# Patient Record
Sex: FEMALE | URBAN - METROPOLITAN AREA
[De-identification: names, ages, dates, MRNs, and addresses within clinical notes are randomized per-mention and may not be internally consistent; named-entity substitution may affect disease eponyms.]

---

## 2019-10-16 ENCOUNTER — INPATIENT (INPATIENT)
Facility: HOSPITAL | Age: 44
LOS: 4 days | Discharge: HOME | DRG: 871 | End: 2019-10-21
Attending: INTERNAL MEDICINE | Admitting: INTERNAL MEDICINE
Payer: MEDICAID

## 2019-10-16 VITALS
HEIGHT: 60 IN | HEART RATE: 85 BPM | OXYGEN SATURATION: 97 % | RESPIRATION RATE: 18 BRPM | SYSTOLIC BLOOD PRESSURE: 90 MMHG | TEMPERATURE: 98 F | WEIGHT: 173.06 LBS | DIASTOLIC BLOOD PRESSURE: 64 MMHG

## 2019-10-16 DIAGNOSIS — M32.9 SYSTEMIC LUPUS ERYTHEMATOSUS, UNSPECIFIED: ICD-10-CM

## 2019-10-16 DIAGNOSIS — A41.9 SEPSIS, UNSPECIFIED ORGANISM: ICD-10-CM

## 2019-10-16 DIAGNOSIS — E11.9 TYPE 2 DIABETES MELLITUS WITHOUT COMPLICATIONS: ICD-10-CM

## 2019-10-16 DIAGNOSIS — R79.1 ABNORMAL COAGULATION PROFILE: ICD-10-CM

## 2019-10-16 DIAGNOSIS — I26.99 OTHER PULMONARY EMBOLISM WITHOUT ACUTE COR PULMONALE: ICD-10-CM

## 2019-10-16 DIAGNOSIS — D61.818 OTHER PANCYTOPENIA: ICD-10-CM

## 2019-10-16 DIAGNOSIS — Z29.9 ENCOUNTER FOR PROPHYLACTIC MEASURES, UNSPECIFIED: ICD-10-CM

## 2019-10-16 DIAGNOSIS — N18.6 END STAGE RENAL DISEASE: ICD-10-CM

## 2019-10-16 DIAGNOSIS — I10 ESSENTIAL (PRIMARY) HYPERTENSION: ICD-10-CM

## 2019-10-16 DIAGNOSIS — L03.90 CELLULITIS, UNSPECIFIED: ICD-10-CM

## 2019-10-16 LAB
ALBUMIN SERPL ELPH-MCNC: 1.3 G/DL — LOW (ref 3.5–5)
ALP SERPL-CCNC: 100 U/L — SIGNIFICANT CHANGE UP (ref 40–120)
ALT FLD-CCNC: 165 U/L DA — HIGH (ref 10–60)
ANION GAP SERPL CALC-SCNC: 9 MMOL/L — SIGNIFICANT CHANGE UP (ref 5–17)
APTT BLD: 70.9 SEC — HIGH (ref 27.5–36.3)
AST SERPL-CCNC: 94 U/L — HIGH (ref 10–40)
BASOPHILS # BLD AUTO: 0 K/UL — SIGNIFICANT CHANGE UP (ref 0–0.2)
BASOPHILS NFR BLD AUTO: 0 % — SIGNIFICANT CHANGE UP (ref 0–2)
BILIRUB SERPL-MCNC: 2.3 MG/DL — HIGH (ref 0.2–1.2)
BUN SERPL-MCNC: 44 MG/DL — HIGH (ref 7–18)
CALCIUM SERPL-MCNC: 6.8 MG/DL — LOW (ref 8.4–10.5)
CHLORIDE SERPL-SCNC: 100 MMOL/L — SIGNIFICANT CHANGE UP (ref 96–108)
CO2 SERPL-SCNC: 25 MMOL/L — SIGNIFICANT CHANGE UP (ref 22–31)
CREAT SERPL-MCNC: 6.81 MG/DL — HIGH (ref 0.5–1.3)
EOSINOPHIL # BLD AUTO: 0.05 K/UL — SIGNIFICANT CHANGE UP (ref 0–0.5)
EOSINOPHIL NFR BLD AUTO: 4.7 % — SIGNIFICANT CHANGE UP (ref 0–6)
GLUCOSE BLDC GLUCOMTR-MCNC: 142 MG/DL — HIGH (ref 70–99)
GLUCOSE SERPL-MCNC: 163 MG/DL — HIGH (ref 70–99)
HCG SERPL-ACNC: 1 MIU/ML — SIGNIFICANT CHANGE UP
HCT VFR BLD CALC: 26.2 % — LOW (ref 34.5–45)
HGB BLD-MCNC: 8.1 G/DL — LOW (ref 11.5–15.5)
INR BLD: 4.53 RATIO — HIGH (ref 0.88–1.16)
LACTATE SERPL-SCNC: 3.9 MMOL/L — HIGH (ref 0.7–2)
LYMPHOCYTES # BLD AUTO: 0.33 K/UL — LOW (ref 1–3.3)
LYMPHOCYTES # BLD AUTO: 32.6 % — SIGNIFICANT CHANGE UP (ref 13–44)
MAGNESIUM SERPL-MCNC: 1.9 MG/DL — SIGNIFICANT CHANGE UP (ref 1.6–2.6)
MCHC RBC-ENTMCNC: 27.1 PG — SIGNIFICANT CHANGE UP (ref 27–34)
MCHC RBC-ENTMCNC: 30.9 GM/DL — LOW (ref 32–36)
MCV RBC AUTO: 87.6 FL — SIGNIFICANT CHANGE UP (ref 80–100)
MONOCYTES # BLD AUTO: 0.35 K/UL — SIGNIFICANT CHANGE UP (ref 0–0.9)
MONOCYTES NFR BLD AUTO: 34.8 % — HIGH (ref 2–14)
NEUTROPHILS # BLD AUTO: 0.26 K/UL — LOW (ref 1.8–7.4)
NEUTROPHILS NFR BLD AUTO: 25.6 % — LOW (ref 43–77)
PHOSPHATE SERPL-MCNC: 3.7 MG/DL — SIGNIFICANT CHANGE UP (ref 2.5–4.5)
PLATELET # BLD AUTO: 24 K/UL — LOW (ref 150–400)
POTASSIUM SERPL-MCNC: 3.6 MMOL/L — SIGNIFICANT CHANGE UP (ref 3.5–5.3)
POTASSIUM SERPL-SCNC: 3.6 MMOL/L — SIGNIFICANT CHANGE UP (ref 3.5–5.3)
PROT SERPL-MCNC: 4.8 G/DL — LOW (ref 6–8.3)
PROTHROM AB SERPL-ACNC: 52.7 SEC — HIGH (ref 10–12.9)
RBC # BLD: 2.99 M/UL — LOW (ref 3.8–5.2)
RBC # FLD: 19.5 % — HIGH (ref 10.3–14.5)
SODIUM SERPL-SCNC: 134 MMOL/L — LOW (ref 135–145)
WBC # BLD: 1.02 K/UL — CRITICAL LOW (ref 3.8–10.5)
WBC # FLD AUTO: 1.02 K/UL — CRITICAL LOW (ref 3.8–10.5)

## 2019-10-16 PROCEDURE — 99285 EMERGENCY DEPT VISIT HI MDM: CPT

## 2019-10-16 PROCEDURE — 99222 1ST HOSP IP/OBS MODERATE 55: CPT

## 2019-10-16 PROCEDURE — 71045 X-RAY EXAM CHEST 1 VIEW: CPT | Mod: 26

## 2019-10-16 RX ORDER — VANCOMYCIN HCL 1 G
1000 VIAL (EA) INTRAVENOUS ONCE
Refills: 0 | Status: COMPLETED | OUTPATIENT
Start: 2019-10-16 | End: 2019-10-16

## 2019-10-16 RX ORDER — ATOVAQUONE 750 MG/5ML
1500 SUSPENSION ORAL DAILY
Refills: 0 | Status: DISCONTINUED | OUTPATIENT
Start: 2019-10-16 | End: 2019-10-21

## 2019-10-16 RX ORDER — AMPICILLIN SODIUM AND SULBACTAM SODIUM 250; 125 MG/ML; MG/ML
INJECTION, POWDER, FOR SUSPENSION INTRAMUSCULAR; INTRAVENOUS
Refills: 0 | Status: DISCONTINUED | OUTPATIENT
Start: 2019-10-16 | End: 2019-10-16

## 2019-10-16 RX ORDER — FERROUS SULFATE 325(65) MG
325 TABLET ORAL DAILY
Refills: 0 | Status: DISCONTINUED | OUTPATIENT
Start: 2019-10-16 | End: 2019-10-21

## 2019-10-16 RX ORDER — AMLODIPINE BESYLATE 2.5 MG/1
10 TABLET ORAL DAILY
Refills: 0 | Status: DISCONTINUED | OUTPATIENT
Start: 2019-10-16 | End: 2019-10-16

## 2019-10-16 RX ORDER — FILGRASTIM 480MCG/1.6
480 VIAL (ML) INJECTION DAILY
Refills: 0 | Status: DISCONTINUED | OUTPATIENT
Start: 2019-10-16 | End: 2019-10-21

## 2019-10-16 RX ORDER — ATOVAQUONE 750 MG/5ML
1500 SUSPENSION ORAL DAILY
Refills: 0 | Status: DISCONTINUED | OUTPATIENT
Start: 2019-10-16 | End: 2019-10-16

## 2019-10-16 RX ORDER — FAMOTIDINE 10 MG/ML
40 INJECTION INTRAVENOUS DAILY
Refills: 0 | Status: DISCONTINUED | OUTPATIENT
Start: 2019-10-16 | End: 2019-10-17

## 2019-10-16 RX ORDER — HYDROCORTISONE 20 MG
50 TABLET ORAL EVERY 8 HOURS
Refills: 0 | Status: DISCONTINUED | OUTPATIENT
Start: 2019-10-16 | End: 2019-10-17

## 2019-10-16 RX ORDER — TRAMADOL HYDROCHLORIDE 50 MG/1
25 TABLET ORAL EVERY 12 HOURS
Refills: 0 | Status: DISCONTINUED | OUTPATIENT
Start: 2019-10-16 | End: 2019-10-21

## 2019-10-16 RX ORDER — HYDROCORTISONE 20 MG
100 TABLET ORAL ONCE
Refills: 0 | Status: COMPLETED | OUTPATIENT
Start: 2019-10-16 | End: 2019-10-16

## 2019-10-16 RX ORDER — HEPARIN SODIUM 5000 [USP'U]/ML
1000 INJECTION INTRAVENOUS; SUBCUTANEOUS ONCE
Refills: 0 | Status: DISCONTINUED | OUTPATIENT
Start: 2019-10-16 | End: 2019-10-16

## 2019-10-16 RX ORDER — CARVEDILOL PHOSPHATE 80 MG/1
6.25 CAPSULE, EXTENDED RELEASE ORAL EVERY 12 HOURS
Refills: 0 | Status: DISCONTINUED | OUTPATIENT
Start: 2019-10-16 | End: 2019-10-16

## 2019-10-16 RX ORDER — AMPICILLIN SODIUM AND SULBACTAM SODIUM 250; 125 MG/ML; MG/ML
3 INJECTION, POWDER, FOR SUSPENSION INTRAMUSCULAR; INTRAVENOUS ONCE
Refills: 0 | Status: COMPLETED | OUTPATIENT
Start: 2019-10-16 | End: 2019-10-16

## 2019-10-16 RX ORDER — INSULIN LISPRO 100/ML
VIAL (ML) SUBCUTANEOUS
Refills: 0 | Status: DISCONTINUED | OUTPATIENT
Start: 2019-10-16 | End: 2019-10-21

## 2019-10-16 RX ORDER — ACETAMINOPHEN 500 MG
650 TABLET ORAL EVERY 6 HOURS
Refills: 0 | Status: DISCONTINUED | OUTPATIENT
Start: 2019-10-16 | End: 2019-10-21

## 2019-10-16 RX ORDER — SEVELAMER CARBONATE 2400 MG/1
1600 POWDER, FOR SUSPENSION ORAL THREE TIMES A DAY
Refills: 0 | Status: DISCONTINUED | OUTPATIENT
Start: 2019-10-16 | End: 2019-10-21

## 2019-10-16 RX ADMIN — Medication 250 MILLIGRAM(S): at 23:15

## 2019-10-16 RX ADMIN — AMPICILLIN SODIUM AND SULBACTAM SODIUM 200 GRAM(S): 250; 125 INJECTION, POWDER, FOR SUSPENSION INTRAMUSCULAR; INTRAVENOUS at 17:29

## 2019-10-16 RX ADMIN — Medication 100 MILLIGRAM(S): at 23:15

## 2019-10-16 RX ADMIN — Medication 16 MILLIGRAM(S): at 14:59

## 2019-10-16 NOTE — ED PROVIDER NOTE - ATTENDING CONTRIBUTION TO CARE
patient with right arm swelling and fever. left AMA from another hospital while she was being treated at another hospital for cellulitis presents with continued swelling and subjective fever at home. was sent to Emergency Department for admission by Dr Hollis. Dr Hollis states patient port can be used as CT angio from another hospital was negative for clot. on   exam, massive edema to right arm, full range of motion to hand and arm, 5/5 strength and sensaiton, good capillary refil. some bullae on upper arm, erythema. admit to medicine, plan for abx

## 2019-10-16 NOTE — ED PROVIDER NOTE - CLINICAL SUMMARY MEDICAL DECISION MAKING FREE TEXT BOX
Pt presented with RUE swelling and erythema concerning for cellulitis versus thromboembolism.  CTA attained from [facility] 2 days prior negative for thromboembolism.  Pt also found to be thrombocytopenic, and found to have a supratherapeutic INR.  Admitted to medicine for management of cellulitis and thrombocytopenia.

## 2019-10-16 NOTE — H&P ADULT - HISTORY OF PRESENT ILLNESS
44F, from home, w/ PMHx of DM, ESRD on HD (Rt Permacath), SLE (on Medrol), PE, and Hypotension who presents to the ED with Rt arm pain and swelling x 4 days.  Pt states on Saturday evening she was having Rt Lateral wrist pain.  She awoke the next morning with Rt Upper extremity pain, swelling and erythema.  Pt states on Monday she went to [facility] for further investigation where she was admitted for Rt UE cellulitis and started on Vancomycin.  Pt underwent a CT and US of the extremity at that time which was negative for vascular disease.  Pt reports fever of 102 on Monday relieved with tylenol, and denies fever, or chills since.  Pt states she underwent HD on Monday, but one of the ports was not functioning properly.  Pt left [facility] because her daughter was feeling ill.  Pt went to HD today at [facility], and was found to have SBP in 70's with worsening RUE edema, and was sent to the ED at request of her nephrologist, Dr. Hollis, for further investigation.  Pt denies numbness/ or tingling to RUE.  Pt denies CP, palpitations, HA, dizziness, N/V/D, urinary symptoms or syncope 44F, from home with PMHx of DM, HTN,  ESRD on HD (Rt Perma cath), SLE (on Medrol), PE, who presented to the ED with Rt arm pain and swelling x 4 days.  Pt states on Saturday evening she was having Rt Lateral wrist pain.  She awoke the next morning with Rt Upper extremity pain, swelling and erythema.  Pt states on Monday she went to [facility] for further investigation where she was admitted for Rt UE cellulitis and started on Vancomycin.  Pt underwent a CT and US of the extremity at that time which was negative for vascular disease.  Pt reports fever of 102 on Monday relieved with tylenol, and denies fever, or chills since.  Pt states she underwent HD on Monday, but one of the ports was not functioning properly.  Pt left [facility] because her daughter was feeling ill.  Pt went to HD today at [facility], and was found to have SBP in 70's with worsening RUE edema, and was sent to the ED at request of her nephrologist, Dr. Hollis, for further investigation.  Pt denies numbness/ or tingling to RUE.  Pt denies CP, palpitations, HA, dizziness, N/V/D, urinary symptoms or syncope.

## 2019-10-16 NOTE — CONSULT NOTE ADULT - SUBJECTIVE AND OBJECTIVE BOX
Patient is a 44y old  Female who presents with a chief complaint of Right arm swelling, pain (16 Oct 2019 16:23)      HPI:  44F, from home, w/ PMHx of DM, ESRD on HD (Rt Permacath), SLE (on Medrol), PE, and Hypotension who presents to the ED with Rt arm pain and swelling x 4 days.  Pt states on Saturday evening she was having Rt Lateral wrist pain.  She awoke the next morning with Rt Upper extremity pain, swelling and erythema.  Pt states on Monday she went to [facility] for further investigation where she was admitted for Rt UE cellulitis and started on Vancomycin.  Pt underwent a CT and US of the extremity at that time which was negative for vascular disease.  Pt reports fever of 102 on Monday relieved with tylenol, and denies fever, or chills since.  Pt states she underwent HD on Monday, but one of the ports was not functioning properly.  Pt left [facility] because her daughter was feeling ill.  Pt went to HD today at [facility], and was found to have SBP in 70's with worsening RUE edema, and was sent to the ED at request of her nephrologist, Dr. Hollis, for further investigation.  Pt denies numbness/ or tingling to RUE.  Pt denies CP, palpitations, HA, dizziness, N/V/D, urinary symptoms or syncope (16 Oct 2019 16:23) Permacath was placed in Aug when she had ESRD from lupus.  one dose cytoxan was given in Aug.  The second dose in Sep was not given due to the low count.  The ecchymosis at both legs developed 1-2 days ago.    She has low blood counts for a while but does not know how low it is.       ROS:  Negative except for:    PAST MEDICAL & SURGICAL HISTORY:  HTN (hypertension)  ESRD (end stage renal disease) on dialysis  PE (pulmonary thromboembolism): Aug 2019  DM (diabetes mellitus)  SLE (systemic lupus erythematosus)  No significant past surgical history      SOCIAL HISTORY:    FAMILY HISTORY:  No pertinent family history in first degree relatives      MEDICATIONS  (STANDING):  amLODIPine   Tablet 10 milliGRAM(s) Oral daily  atovaquone Suspension 1500 milliGRAM(s) Oral daily  carvedilol 6.25 milliGRAM(s) Oral every 12 hours  famotidine    Tablet 40 milliGRAM(s) Oral daily  ferrous    sulfate 325 milliGRAM(s) Oral daily  insulin lispro (HumaLOG) corrective regimen sliding scale   SubCutaneous Before meals and at bedtime  methylPREDNISolone 16 milliGRAM(s) Oral daily  sevelamer carbonate 1600 milliGRAM(s) Oral three times a day  vancomycin  IVPB 1000 milliGRAM(s) IV Intermittent once    MEDICATIONS  (PRN):      Allergies    No Known Allergies    Intolerances        Vital Signs Last 24 Hrs  T(C): 36.9 (16 Oct 2019 20:30), Max: 36.9 (16 Oct 2019 20:30)  T(F): 98.5 (16 Oct 2019 20:30), Max: 98.5 (16 Oct 2019 20:30)  HR: 89 (16 Oct 2019 20:30) (85 - 90)  BP: 101/60 (16 Oct 2019 20:30) (90/64 - 110/59)  BP(mean): --  RR: 18 (16 Oct 2019 20:30) (15 - 18)  SpO2: 98% (16 Oct 2019 20:30) (97% - 98%)    PHYSICAL EXAM  General: adult in NAD  HEENT: clear oropharynx, anicteric sclera, pink conjunctiva  Neck: supple  CV: normal S1/S2 with no murmur rubs or gallops  Lungs: positive air movement b/l ant lungs,clear to auscultation, no wheezes, no rales  Abdomen: soft non-tender non-distended, no hepatosplenomegaly  Ext: right shoulder swollen rifhr arm swollen right forearm tense swelling with blisters.  right hand and fingers are still warm and pink.  ecchymosis at both thighs, large area  Skin:  petechiae  Neuro: alert and oriented X 4, no focal deficits      LABS:                          8.1    1.02  )-----------( 24       ( 16 Oct 2019 12:45 )             26.2         Mean Cell Volume : 87.6 fl  Mean Cell Hemoglobin : 27.1 pg  Mean Cell Hemoglobin Concentration : 30.9 gm/dL  Auto Neutrophil # : 0.26 K/uL  Auto Lymphocyte # : 0.33 K/uL  Auto Monocyte # : 0.35 K/uL  Auto Eosinophil # : 0.05 K/uL  Auto Basophil # : 0.00 K/uL  Auto Neutrophil % : 25.6 %  Auto Lymphocyte % : 32.6 %  Auto Monocyte % : 34.8 %  Auto Eosinophil % : 4.7 %  Auto Basophil % : 0.0 %      Serial CBC's  10-16 @ 12:45  Hct-26.2 / Hgb-8.1 / Plat-24 / RBC-2.99 / WBC-1.02      10-16    134<L>  |  100  |  44<H>  ----------------------------<  163<H>  3.6   |  25  |  6.81<H>    Ca    6.8<L>      16 Oct 2019 12:45  Phos  3.7     10-16  Mg     1.9     10-16    TPro  4.8<L>  /  Alb  1.3<L>  /  TBili  2.3<H>  /  DBili  x   /  AST  94<H>  /  ALT  165<H>  /  AlkPhos  100  10-16      PT/INR - ( 16 Oct 2019 14:13 )   PT: 52.7 sec;   INR: 4.53 ratio         PTT - ( 16 Oct 2019 14:13 )  PTT:70.9 sec                BLOOD SMEAR INTERPRETATION:       RADIOLOGY & ADDITIONAL STUDIES:

## 2019-10-16 NOTE — CONSULT NOTE ADULT - PROBLEM SELECTOR RECOMMENDATION 2
? septic thrombosis  blood culture, antibiotics  may need stress dose of steroid  right forearm is very tense and very tender, need to watch for compartment syndrome

## 2019-10-16 NOTE — CONSULT NOTE ADULT - ASSESSMENT
# RUE Cellulitis    would recommend:    1. Continue Vancomycin and keep level between 15 to 20  2. Keep RUE  elevated    will follow the patient with you and make further recommendation based on the clinical course and Lab results  Thank you for the opportunity to participate in Ms. SAMAN's care Patient is a 44y old  Female who presents with multiple co-morbidities including ESRD via Right ACW Perm-cath and now presents to the ER for evaluation  of Right arm swelling, pain (16 Oct 2019 16:23). On admission, she has no fever but Leukopenia. She has started on Vancomycin and the ID consult requested to assist with further evaluation and antibiotic management.     # RUE Cellulitis -in the setting of Perm-cath at right ACW    would recommend:    1. Continue Vancomycin and keep level between 15 to 20 and Add Cefazolin  2. Keep RUE  elevated  3. Need to removal of Perm-cath   4. Follow up blood cultures   5. Pain management as needed    d/w patient and ER Nursing staff    will follow the patient with you and make further recommendation based on the clinical course and Lab results  Thank you for the opportunity to participate in Ms. DAVISON's care

## 2019-10-16 NOTE — H&P ADULT - PROBLEM SELECTOR PLAN 1
-p/w RUE swelling and pain,   -BP: 90s/60s, afebrile WBC: 1K Lactate: 3.9  -ED course:  IV Unasyn x1   -Sepsis 2/2 cellulitis of RUE   -c/w Vanco after dialysis   - follow blood culture   -ID consult: Dr Pereyra consulted   - RUE elevation   -Patient had Doppler of RUE and CT angio chest from [place], reports in charts: both test were negative for any clots   -Will give Hydrocortisone 100 mg IV stat one dose: STRESS dose one time, primary team to consider continuing it, stress dose steroids,   -Primary team to send culture through Right Perma cath during HD -p/w RUE swelling and pain,   -BP: 90s/60s, afebrile WBC: 1K Lactate: 3.9  -ED course:  IV Unasyn x1   -Sepsis 2/2 cellulitis of RUE   -c/w Vanco after dialysis   - follow blood culture   -ID consult: Dr Pereyra consulted   - RUE elevation   -Patient had Doppler of RUE and CT angio chest from [place], reports in charts: both test were negative for any clots   -gave Hydrocortisone 100 mg IV stat one dose:   -Hold Methyl prednisone 16 mg ( home dose of steroids), will start on stress dose of steroids: hydrocortef 50 q8   -Primary team to send culture through Right Perma cath during HD tomorrow  -monitor for compartment syndrome

## 2019-10-16 NOTE — H&P ADULT - PROBLEM SELECTOR PLAN 4
-ESRD, on HD since August through Right perma cath, on M/W/F schedule  -Right perma cath dysfunction on Monday but she was able to receive complete session of HD    - Last HD on Monday   - Dr Hollis consulted   - Vascular consulted by ED, recommended continuing HD through right perma cath for now as her INR is elevated and platelet count is low. Follow Vascular for changing Perma cath and eventually for AV fistula formation

## 2019-10-16 NOTE — CONSULT NOTE ADULT - PROBLEM SELECTOR RECOMMENDATION 9
one dose cytoxan given in Aug, it should not cause pancytopenia now.  The pancytopenia is chronic, a feature of SLE  in view of severe infection, will give zarxio  large area of ecchymosis.  platelet transfusion might not work well for ESRD.  but can be given if she bleeds

## 2019-10-16 NOTE — ED PROVIDER NOTE - OBJECTIVE STATEMENT
Pt is a 44F, from home, w/ PMHx of DM, ESRD on HD (Rt permacath), SLE (on Medrol), PE, and Hypotension who presents to the ED with Rt arm pain and swelling x 4 days.  Pt states on Saturday evening she was having Rt Lateral wrist pain.  She awoke the next morning with Rt Upper extremity pain, swelling and erythema.  Pt states she went to [facility] for further investigation where she was admitted for Rt UE cellulitis and started on Vancomycin.  Pt underwent a CT and US of the extremity at that time which was negative for vascular disease.  Pt reports fever of 102 on Monday relieve d with tylenol. Pt is a 44F, from home, w/ PMHx of DM, ESRD on HD (Rt Permacath), SLE (on Medrol), PE, and Hypotension who presents to the ED with Rt arm pain and swelling x 4 days.  Pt states on Saturday evening she was having Rt Lateral wrist pain.  She awoke the next morning with Rt Upper extremity pain, swelling and erythema.  Pt states on Monday she went to [facility] for further investigation where she was admitted for Rt UE cellulitis and started on Vancomycin.  Pt underwent a CT and US of the extremity at that time which was negative for vascular disease.  Pt reports fever of 102 on Monday relieved with tylenol, and denies fever, or chills since.  Pt states she underwent HD on Monday, but one of the ports was not functioning properly.  Pt left [facility] because her daughter was feeling ill.  Pt went to HD today at [facility], and was found to have SBP in 70's with worsening RUE edema, and was sent to the ED at request of her nephrologist, Dr. Hollis, for further investigation.  Pt denies numbness/ or tingling to RUE.  Pt denies CP, palpitations, HA, dizziness, N/V/D, urinary symptoms or syncope.

## 2019-10-16 NOTE — H&P ADULT - NSICDXPASTMEDICALHX_GEN_ALL_CORE_FT
PAST MEDICAL HISTORY:  DM (diabetes mellitus)     ESRD (end stage renal disease) on dialysis     HTN (hypertension)     PE (pulmonary thromboembolism) Aug 2019    SLE (systemic lupus erythematosus)

## 2019-10-16 NOTE — ED ADULT NURSE NOTE - CHIEF COMPLAINT QUOTE
sagrario from dialysis center sent by pmd for admission for Rt arm pain , swelling worsening . per report pt w/ RT permacath  for hemodialysis in August . was seen recently at [facility] for RT central stenosis , cellulitis Rt arm but pt AMA'd

## 2019-10-16 NOTE — H&P ADULT - PROBLEM SELECTOR PLAN 8
-home med: Amlodipine, Coreg   Held in setting of sepsis and hypotension   Monitor BP   restart as needed

## 2019-10-16 NOTE — ED ADULT TRIAGE NOTE - CHIEF COMPLAINT QUOTE
sagrario from dialysis center sent by pmd for admission for Rt arm pain , swelling worsening . per report pt w/ RT permacath  for hemodialysis in August . was seen recently at [facility] for RT central stenosis , cellulitis Rt arm but pt AMA'd

## 2019-10-16 NOTE — CONSULT NOTE ADULT - ASSESSMENT
44 year old female with malfunctioning permacath, with INR of 4.53, Cr 6.81    - get INR under better control  - hold off on permacath removal  - hold off on shiley placement 44 year old female with malfunctioning permacath, with INR of 4.53, Cr 6.81, plt 24    - get INR under better control  - hold off on permacath removal  - hold off on shiley placement  - to discuss with Dr. Nolasco 44 year old female with malfunctioning permacath, with INR of 4.53, Cr 6.81, plt 24    - get INR under better control  - hold off on permacath removal  - hold off on shiley placement  - f/u RUE venous duplex and CTA  - discussed with Dr. Nolasco  - recommend transferring to [facility] for advanced medical care

## 2019-10-16 NOTE — H&P ADULT - PROBLEM SELECTOR PLAN 2
-p/w WBC: 1K, RBC: 2.9K, Platelet: 24K  Pancytopenia could be SLE vs sepsis   - Will give Hydrocortisone  IV stat one dose: STRESS dose one time, primary team to consider continuing it, stress dose steroids,   - Dr Bates consulted, consider giving filgrastin as WBC count is 1K in setting of sepsis and immunocompromised state in setting of chronic steroid use   -Platelet count: 24K, monitor for bleeding for now   -Dr Bates consulted -p/w WBC: 1K, RBC: 2.9K, Platelet: 24K  Pancytopenia could be SLE vs sepsis   - gave Hydrocortisone 100 mg IV stat one dose:   -Hold Methyl prednisone 16 mg ( home dose of steroids), will start on stress dose of steroids: hydrocortef 50 q8   - Dr Bates consulted, consider giving filgrastin as WBC count is 1K in setting of sepsis and immunocompromised state in setting of chronic steroid use   -Platelet count: 24K, monitor for bleeding for now   -Dr Bates consulted

## 2019-10-16 NOTE — CONSULT NOTE ADULT - SUBJECTIVE AND OBJECTIVE BOX
=================Interval HPI===================  - HPI: 44 Female from home w/ PMHx of DM, ESRD on HD (Rt Permacath) MWF, SLE (on Medrol), PE on Coumadin, and Hypotension who presents to the ED with Rt arm pain and swelling x 4 days.  Patient states that on Saturday evening she was having Right Lateral wrist pain.  She awoke the next morning with Rt Upper extremity pain, swelling and erythema.  Pt states on Monday she went to [facility] for further investigation where she was admitted for Right LUE cellulitis and started on Vancomycin.  Patient underwent a CT and US of the extremity at that time which was negative for vascular disease( Records on chart). Patient left [facility] because her daughter was feeling ill.  Patient went to HD today at [facility], and was found to have SBP in 70's with worsening RUE edema, and was sent to the ED at request of her nephrologist, Dr. Hollis, for further investigation.  Pt denies numbness/ or tingling to RUE.  Pt denies CP, palpitations, HA, dizziness, N/V/D, urinary symptoms or syncope.      ================CHIEF COMPLAINT===============  Patient is a 44y old  Female who presents with a chief complaint of Right arm swelling, pain (16 Oct 2019 21:31)        =========INTERVAL HPI/OVERNIGHT EVENTS=========  Offers no new complaints      ============CURRENT MEDICATIONS===============    MEDICATIONS  (STANDING):  amLODIPine   Tablet 10 milliGRAM(s) Oral daily  atovaquone Suspension 1500 milliGRAM(s) Oral daily  carvedilol 6.25 milliGRAM(s) Oral every 12 hours  famotidine    Tablet 40 milliGRAM(s) Oral daily  ferrous    sulfate 325 milliGRAM(s) Oral daily  filgrastim-sndz Injectable 480 MICROGram(s) SubCutaneous daily  insulin lispro (HumaLOG) corrective regimen sliding scale   SubCutaneous Before meals and at bedtime  methylPREDNISolone 16 milliGRAM(s) Oral daily  sevelamer carbonate 1600 milliGRAM(s) Oral three times a day  vancomycin  IVPB 1000 milliGRAM(s) IV Intermittent once    MEDICATIONS  (PRN):  acetaminophen   Tablet .. 650 milliGRAM(s) Oral every 6 hours PRN Temp greater or equal to 38C (100.4F), Mild Pain (1 - 3)  traMADol 25 milliGRAM(s) Oral every 12 hours PRN Severe Pain (7 - 10)        ============REVIEW OF SYSTEMS==================    CONSTITUTIONAL: No fever  EYES: no acute visual disturbances  NECK: No pain or stiffness  RESPIRATORY: No cough; No shortness of breath  CARDIOVASCULAR: No chest pain, no palpitations  GASTROINTESTINAL: No pain. No nausea or vomiting; No diarrhea   NEUROLOGICAL: No headache or numbness, no tremors  MUSCULOSKELETAL: Right upper extremity swelling and pain  GENITOURINARY: no dysuria, no frequency, no hesitancy  PSYCHIATRY: no depression , no anxiety  ALL OTHER  ROS negative      ================VITALS SIGNS=====================  Vital Signs Last 24 Hrs  T(C): 36.9 (16 Oct 2019 20:30), Max: 36.9 (16 Oct 2019 20:30)  T(F): 98.5 (16 Oct 2019 20:30), Max: 98.5 (16 Oct 2019 20:30)  HR: 89 (16 Oct 2019 20:30) (85 - 90)  BP: 101/60 (16 Oct 2019 20:30) (90/64 - 110/59)  BP(mean): --  RR: 18 (16 Oct 2019 20:30) (15 - 18)  SpO2: 98% (16 Oct 2019 20:30) (97% - 98%)    ===============PHYSICAL EXAM====================    GENERAL: NAD  HEENT: Normocephalic;  conjunctivae and sclerae clear; moist mucous membranes;   NECK : supple  CHEST/LUNG: Clear to auscultation bilaterally with good air entry. Right sided permacath  HEART: S1 S2  regular; no murmurs, gallops or rubs  ABDOMEN: Soft, Nontender, Nondistended; Bowel sounds present  EXTREMITIES: Right upper extremity edema. Erythema of the elbow. Pulses intact with good capillary refill  NERVOUS SYSTEM:  Awake and alert; Oriented  to place, person and time    ==============LABORATORIES======================  LABS:                        8.1    1.02  )-----------( 24       ( 16 Oct 2019 12:45 )             26.2     10-16    134<L>  |  100  |  44<H>  ----------------------------<  163<H>  3.6   |  25  |  6.81<H>    Ca    6.8<L>      16 Oct 2019 12:45  Phos  3.7     10-16  Mg     1.9     10-16    TPro  4.8<L>  /  Alb  1.3<L>  /  TBili  2.3<H>  /  DBili  x   /  AST  94<H>  /  ALT  165<H>  /  AlkPhos  100  10-16    PT/INR - ( 16 Oct 2019 14:13 )   PT: 52.7 sec;   INR: 4.53 ratio         PTT - ( 16 Oct 2019 14:13 )  PTT:70.9 sec    CAPILLARY BLOOD GLUCOSE          =============INPUTS/OUPUTS=====================        RADIOLOGY & ADDITIONAL TESTS:    Imaging Personally Reviewed:  YES    Consultant(s) Notes Reviewed:   YES    Care Discussed with Consultants : YES    Plan of care was discussed with patient  and /or primary care giver; all questions and concerns were addressed and care was aligned with patient's wishes. Time was allowed for questions that were answered to the best of my abilities

## 2019-10-16 NOTE — H&P ADULT - ASSESSMENT
44F, from home with PMHx of DM, HTN,  ESRD on HD (Rt Perma cath), SLE (on Medrol), PE, who presented to the ED with Rt arm pain and swelling x 4 days.     Admitted with sepsis due to RUE cellulitis

## 2019-10-16 NOTE — CONSULT NOTE ADULT - ASSESSMENT
44 year old lady was diagnosed lupus and ESRD on HD via right PC.  Swelling, pain, erythema at right arm, fever developed last week and was given antibiotics.  She came in for hypotension, worsening right arm, and pancytopenia with ecchymosis.

## 2019-10-16 NOTE — ED ADULT NURSE NOTE - OBJECTIVE STATEMENT
Patient present to ED with right arm swelling Patient present to ED with right arm swelling for past 4 days.  Pt states on Saturday evening she was having Rt Lateral wrist pain.  She awoke the next morning with Rt Upper extremity pain, swelling and erythema.  Pt states on Monday she went to [facility] and was admitted for cellulitus treated and sent home but continues to have pain and swelling. At present time denies any pain

## 2019-10-16 NOTE — H&P ADULT - PROBLEM SELECTOR PLAN 7
-c/w Methylprednisolone 16 mg OD   c/w Atovaquone   Will give one dose of stress dose steroids   not in flare, but will send ESR, CRP, C3., C4 -gave Hydrocortisone 100 mg IV stat one dose:   -Hold Methyl prednisone 16 mg ( home dose of steroids), will start on stress dose of steroids: hydrocortef 50 q8    -c/w Atovaquone   -PO Protonix for GI proph   -not in flare, but will send ESR, CRP, C3., C4

## 2019-10-16 NOTE — H&P ADULT - PROBLEM SELECTOR PLAN 6
on coumadin forPE since August   -INR: 4.5, hold coumadin 3 mg tonight   monitor INR in AM   restart coumadin once INR is therapeutic

## 2019-10-16 NOTE — ED PROVIDER NOTE - PMH
DM (diabetes mellitus)    ESRD (end stage renal disease) on dialysis    PE (pulmonary thromboembolism)    SLE (systemic lupus erythematosus)

## 2019-10-16 NOTE — ED ADULT NURSE NOTE - NSIMPLEMENTINTERV_GEN_ALL_ED
Implemented All Fall with Harm Risk Interventions:  [place] to call system. Call bell, personal items and telephone within reach. Instruct patient to call for assistance. Room bathroom lighting operational. Non-slip footwear when patient is off stretcher. Physically safe environment: no spills, clutter or unnecessary equipment. Stretcher in lowest position, wheels locked, appropriate side rails in place. Provide visual cue, wrist band, yellow gown, etc. Monitor gait and stability. Monitor for mental status changes and reorient to person, place, and time. Review medications for side effects contributing to fall risk. Reinforce activity limits and safety measures with patient and family. Provide visual clues: red socks.

## 2019-10-16 NOTE — CONSULT NOTE ADULT - SUBJECTIVE AND OBJECTIVE BOX
Service:  Consultation Note    Patient is a 44y old Female who presents with a chief complaint of right arm swelling    HPI: Pt is a 44F, from home, w/ PMHx of DM, ESRD on HD (Rt Permacath), SLE (on Medrol), PE, and Hypotension who presents to the ED with Rt arm pain and swelling x 4 days.  Pt states on Saturday evening she was having Rt Lateral wrist pain.  She awoke the next morning with Rt Upper extremity pain, swelling and erythema.  Pt states on Monday she went to [facility] for further investigation where she was admitted for Rt UE cellulitis and started on Vancomycin.  Pt underwent a CT and US of the extremity at that time which was negative for vascular disease.  Pt reports fever of 102 on Monday relieved with tylenol, and denies fever, or chills since.  Pt states she underwent HD on Monday, but one of the ports was not functioning properly.  Pt left [facility] because her daughter was feeling ill.  Pt went to HD today at [facility], and was found to have SBP in 70's with worsening RUE edema, and was sent to the ED at request of her nephrologist, Dr. Hollis, for further investigation.  Pt denies numbness/ or tingling to RUE.  Pt denies CP, palpitations, HA, dizziness, N/V/D, urinary symptoms or syncope.    Vascular surgery consulted for malfunctioning right permacath port and right upper extremity swelling. As per pt, pt had HD on Monday that lasted longer than usual and was incomplete. Pt reports right arm swelling began 4 days ago, on janny 10/13/16 and has been worsening since. Denies numbness/tingling. RUE warm. Permacath site with dried blood, no surrounding erythema/fluctuance or overlying skin changes noted. Pt on 3mg Coumadin daily, reports it was stopped about the same time the right arm swelling started.    PAST MEDICAL & SURGICAL HISTORY:  ESRD (end stage renal disease) on dialysis  PE (pulmonary thromboembolism)  DM (diabetes mellitus)  SLE (systemic lupus erythematosus)  No significant past surgical history    Allergies: No Known Allergies    Vital Signs Last 24 Hrs  T(C): 36.8 (16 Oct 2019 14:53), Max: 36.8 (16 Oct 2019 14:53)  T(F): 98.2 (16 Oct 2019 14:53), Max: 98.2 (16 Oct 2019 14:53)  HR: 90 (16 Oct 2019 14:53) (85 - 90)  BP: 110/59 (16 Oct 2019 14:53) (90/64 - 110/59)  RR: 15 (16 Oct 2019 14:53) (15 - 18)  SpO2: 98% (16 Oct 2019 14:53) (97% - 98%)    Physical Exam:  Gen: alert, awake, in no acute distress  Abd: obese, soft, NT, ND  Ext: RUE: significantly edematous from shoulder to fingers; distal pulses palpable, NV intact.  Skin: multiple small petechiae diffusely throughout abdomen and groin    Labs:                          8.1    1.02  )-----------( 24       ( 16 Oct 2019 12:45 )             26.2     10-16    134<L>  |  100  |  44<H>  ----------------------------<  163<H>  3.6   |  25  |  6.81<H>    Ca    6.8<L>      16 Oct 2019 12:45  Phos  3.7     10-16  Mg     1.9     10-16    TPro  4.8<L>  /  Alb  1.3<L>  /  TBili  2.3<H>  /  DBili  x   /  AST  94<H>  /  ALT  165<H>  /  AlkPhos  100  10-16    PT/INR - ( 16 Oct 2019 14:13 )   PT: 52.7 sec;   INR: 4.53 ratio    PTT - ( 16 Oct 2019 14:13 )  PTT:70.9 sec Service:  Consultation Note    Patient is a 44y old Female who presents with a chief complaint of right arm swelling    HPI: Pt is a 44F, from home, w/ PMHx of DM, ESRD on HD (Rt Permacath), SLE (on Medrol), PE, and Hypotension who presents to the ED with Rt arm pain and swelling x 4 days.  Pt states on Saturday evening she was having Rt Lateral wrist pain.  She awoke the next morning with Rt Upper extremity pain, swelling and erythema.  Pt states on Monday she went to [facility] for further investigation where she was admitted for Rt UE cellulitis and started on Vancomycin.  Pt underwent a CT and US of the extremity at that time which was negative for vascular disease.  Pt reports fever of 102 on Monday relieved with tylenol, and denies fever, or chills since.  Pt states she underwent HD on Monday, but one of the ports was not functioning properly.  Pt left [facility] because her daughter was feeling ill.  Pt went to HD today at [facility], and was found to have SBP in 70's with worsening RUE edema, and was sent to the ED at request of her nephrologist, Dr. Hollis, for further investigation.  Pt denies numbness/ or tingling to RUE.  Pt denies CP, palpitations, HA, dizziness, N/V/D, urinary symptoms or syncope.    Vascular surgery consulted for malfunctioning right permacath port and right upper extremity swelling. As per pt, pt had HD on Monday that lasted longer than usual and was incomplete. Pt reports right arm swelling began 4 days ago, on janny 10/13/16 and has been worsening since. Denies numbness/tingling. RUE warm. Permacath site with dried blood, no surrounding erythema/fluctuance or overlying skin changes noted. Pt on 3mg Coumadin daily, reports it was stopped about the same time the right arm swelling started.    PAST MEDICAL & SURGICAL HISTORY:  ESRD (end stage renal disease) on dialysis  PE (pulmonary thromboembolism)  DM (diabetes mellitus)  SLE (systemic lupus erythematosus)  No significant past surgical history    Allergies: No Known Allergies    Vital Signs Last 24 Hrs  T(C): 36.8 (16 Oct 2019 14:53), Max: 36.8 (16 Oct 2019 14:53)  T(F): 98.2 (16 Oct 2019 14:53), Max: 98.2 (16 Oct 2019 14:53)  HR: 90 (16 Oct 2019 14:53) (85 - 90)  BP: 110/59 (16 Oct 2019 14:53) (90/64 - 110/59)  RR: 15 (16 Oct 2019 14:53) (15 - 18)  SpO2: 98% (16 Oct 2019 14:53) (97% - 98%)    Physical Exam:  Gen: alert, awake, in no acute distress  Abd: obese, soft, NT, ND  Ext: RUE: significantly edema noted from right shoulder to fingers; distal pulses palpable, NV intact. LUE normal.  Skin: multiple small petechiae diffusely throughout abdomen and groin    Labs:                          8.1    1.02  )-----------( 24       ( 16 Oct 2019 12:45 )             26.2     10-16    134<L>  |  100  |  44<H>  ----------------------------<  163<H>  3.6   |  25  |  6.81<H>    Ca    6.8<L>      16 Oct 2019 12:45  Phos  3.7     10-16  Mg     1.9     10-16    TPro  4.8<L>  /  Alb  1.3<L>  /  TBili  2.3<H>  /  DBili  x   /  AST  94<H>  /  ALT  165<H>  /  AlkPhos  100  10-16    PT/INR - ( 16 Oct 2019 14:13 )   PT: 52.7 sec;   INR: 4.53 ratio    PTT - ( 16 Oct 2019 14:13 )  PTT:70.9 sec

## 2019-10-16 NOTE — H&P ADULT - NSHPREVIEWOFSYSTEMS_GEN_ALL_CORE
REVIEW OF SYSTEMS:  CONSTITUTIONAL: No fever,   EYES: no acute visual disturbances  NECK: No pain or stiffness  RESPIRATORY: No cough; No shortness of breath  CARDIOVASCULAR: No chest pain, no palpitations  GASTROINTESTINAL: No pain. No nausea or vomiting; No diarrhea   NEUROLOGICAL: No headache or numbness, no tremors  MUSCULOSKELETAL: No joint pain, no muscle pain  GENITOURINARY: no dysuria, no frequency, no hesitancy  PSYCHIATRY: no depression , no anxiety  ALL OTHER  ROS negative REVIEW OF SYSTEMS:  CONSTITUTIONAL: No fever,   EYES: no acute visual disturbances  NECK: No pain or stiffness  RESPIRATORY: No cough; No shortness of breath  CARDIOVASCULAR: No chest pain, no palpitations  GASTROINTESTINAL: No pain. No nausea or vomiting; No diarrhea   NEUROLOGICAL: No headache or numbness, no tremors  MUSCULOSKELETAL:as above   GENITOURINARY: no dysuria, no frequency, no hesitancy  PSYCHIATRY: no depression , no anxiety  ALL OTHER  ROS negative

## 2019-10-16 NOTE — CONSULT NOTE ADULT - ASSESSMENT
44 Female from home w/ PMHx of DM, ESRD on HD (Rt Permacath) MWF, SLE (on Medrol), PE, and Hypotension who presents to the ED with Rt arm pain and swelling x 4 days.  Patient states that on Saturday evening she was having Right Lateral wrist pain.  She awoke the next morning with Rt Upper extremity pain, swelling and erythema.  Pt states on Monday she went to [facility] for further investigation where she was admitted for Right LUE cellulitis and started on Vancomycin.  Patient underwent a CT and US of the extremity at that time which was negative for vascular disease( Records on chart)  She refers episodes of fever on Monday but denies fever, or chills since.  Pt states she underwent HD on Monday, but one of the ports was not functioning properly.  Pt left [facility] because her daughter was feeling ill.  Pt went to HD today at [facility], and was found to have SBP in 70's with worsening RUE edema, and was sent to the ED at request of her nephrologist, Dr. Hollis, for further investigation.  Pt denies numbness/ or tingling to RUE.  Pt denies CP, palpitations, HA, dizziness, N/V/D, urinary symptoms or syncope        Assessment:  - Right Upper Extremity Swelling 2ry to Cellulitis  - Elevated INR  - DM  - ESRD on HD (Rt Permacath)  - SLE (on Medrol)  - PE on Coumadin   - History of Hypotension     Plan:  Neuro:  - avoid any sedating meds  - AAOX 3    CV:  - Blood pressure borderline low  - Hold Any BP medications    Pulm:  - History of PE. Currently on Coumadin  - INR currently elevated. Hold any anticoagulation  - CT Angio performed at [place] showing No central venous occlusion . Pulmonary Artery is enlarged likely due to PE.   - Lung Clears on physical exam at this time     ID:  - Right upper extremity Cellulitis. C/W Vancomycin   - ID Dr Pereyra consulted  - Follow B/c    Nephro:  - ESRD on HD (Rt Permacath) MWF  - Nephrology Dr Hollis    GI:  - Renal Diet with diabetic restrictions     Heme:  - History of PE. Currently on Coumadin  - Elevated INR. Hold Coumdain   - Daily INR    Endo:  - target CBG < 180    EXT:  - Right Upper extremity Cellulitis. C/W IV Abx Vancomycin   - Pulses intact on the right upper extremity   - Ultrasound performed in [place] showing no evidence of Deep Venous Thrombosis  - Recommend repeated Doppler of lower extrmeities    Prophy:  - Hold Anticoagulation due to elevated INR    Dispo:  - No need for ICU care at this time 44 Female from home w/ PMHx of DM, ESRD on HD (Rt Permacath) MWF, SLE (on Medrol), PE on Coumadin, and Hypotension who presents to the ED with Rt arm pain and swelling x 4 days.  Patient states that on Saturday evening she was having Right Lateral wrist pain.  She awoke the next morning with Rt Upper extremity pain, swelling and erythema.  Pt states on Monday she went to [facility] for further investigation where she was admitted for Right LUE cellulitis and started on Vancomycin.  Patient underwent a CT and US of the extremity at that time which was negative for vascular disease( Records on chart). Patient left [facility] because her daughter was feeling ill.  Patient went to HD today at [facility], and was found to have SBP in 70's with worsening RUE edema, and was sent to the ED at request of her nephrologist, Dr. Hollis, for further investigation.  Pt denies numbness/ or tingling to RUE.  Pt denies CP, palpitations, HA, dizziness, N/V/D, urinary symptoms or syncope.      Admitted to the medicine floor for Right Upper Extremity Swelling 2ry to Cellulitis. ICU consulted by Hematologist Oncologist for further management.     Assessment:  - Right Upper Extremity Swelling 2ry to Cellulitis  - Elevated INR  - DM  - ESRD on HD (Rt Permacath)  - SLE (on Medrol)  - PE on Coumadin   - History of Hypotension     Plan:  Neuro:  - avoid any sedating meds  - AAOX 3    CV:  - Blood pressure borderline low  - Hold Any BP medications    Pulm:  - History of PE. Currently on Coumadin  - INR currently elevated. Hold any anticoagulation  - CT Angio performed at [place] showing No central venous occlusion . Pulmonary Artery is enlarged likely due to PE.   - Lung Clears on physical exam at this time     ID:  - Right upper extremity Cellulitis. C/W Vancomycin   - ID Dr Pereyra consulted  - Follow B/c    Nephro:  - ESRD on HD (Rt Permacath) MWF  - Nephrology Dr Hollis    GI:  - Renal Diet with diabetic restrictions     Heme:  - History of PE. Currently on Coumadin  - Elevated INR. Hold Coumdain   - Daily INR    Endo:  - target CBG < 180    EXT:  - Right Upper extremity Cellulitis. C/W IV Abx Vancomycin   - Pulses intact on the right upper extremity. Good capillary refill. Able to move extremity   - Ultrasound performed in [place] showing no evidence of Deep Venous Thrombosis  - Recommend repeated Doppler of lower extremities  - Management as per Vascular Surgery     Prophy:  - Hold Anticoagulation due to elevated INR  - SCD    Dispo:  - No need for ICU care at this time

## 2019-10-16 NOTE — CONSULT NOTE ADULT - SUBJECTIVE AND OBJECTIVE BOX
Patient is a 44y old  Female who presents with a chief complaint of Right arm swelling, pain (16 Oct 2019 16:23)        REVIEW OF SYSTEMS: Total of twelve systems have been reviewed with patient and found to be negative unless mentioned in HPI        PAST MEDICAL & SURGICAL HISTORY:  HTN (hypertension)  ESRD (end stage renal disease) on dialysis  PE (pulmonary thromboembolism): Aug 2019  DM (diabetes mellitus)  SLE (systemic lupus erythematosus)  No significant past surgical history        SOCIAL HISTORY  Alcohol: Does not drink  Tobacco: Does not smoke  Illicit substance use: None      FAMILY HISTORY: Non contributory to the present illness        ALLERGIES:  No Known Allergies      Vital Signs Last 24 Hrs  T(C): 36.9 (16 Oct 2019 20:30), Max: 36.9 (16 Oct 2019 20:30)  T(F): 98.5 (16 Oct 2019 20:30), Max: 98.5 (16 Oct 2019 20:30)  HR: 89 (16 Oct 2019 20:30) (85 - 90)  BP: 101/60 (16 Oct 2019 20:30) (90/64 - 110/59)  BP(mean): --  RR: 18 (16 Oct 2019 20:30) (15 - 18)  SpO2: 98% (16 Oct 2019 20:30) (97% - 98%)      PHYSICAL EXAM:  GENERAL: Not in distress   CHEST/LUNG:  Air entry bilaterally  HEART: s1 and s2 present  ABDOMEN:  Nontender and  Nondistended  EXTREMITIES: No pedal  edema  CNS: Awake and Alert        LABS:                        8.1    1.02  )-----------( 24       ( 16 Oct 2019 12:45 )             26.2         10-16    134<L>  |  100  |  44<H>  ----------------------------<  163<H>  3.6   |  25  |  6.81<H>    Ca    6.8<L>      16 Oct 2019 12:45  Phos  3.7     10-16  Mg     1.9     10-16    TPro  4.8<L>  /  Alb  1.3<L>  /  TBili  2.3<H>  /  DBili  x   /  AST  94<H>  /  ALT  165<H>  /  AlkPhos  100  10-16    PT/INR - ( 16 Oct 2019 14:13 )   PT: 52.7 sec;   INR: 4.53 ratio      PTT - ( 16 Oct 2019 14:13 )  PTT:70.9 sec          MEDICATIONS  (STANDING):  amLODIPine   Tablet 10 milliGRAM(s) Oral daily  atovaquone Suspension 1500 milliGRAM(s) Oral daily  carvedilol 6.25 milliGRAM(s) Oral every 12 hours  famotidine    Tablet 40 milliGRAM(s) Oral daily  ferrous    sulfate 325 milliGRAM(s) Oral daily  insulin lispro (HumaLOG) corrective regimen sliding scale   SubCutaneous Before meals and at bedtime  methylPREDNISolone 16 milliGRAM(s) Oral daily  sevelamer carbonate 1600 milliGRAM(s) Oral three times a day  vancomycin  IVPB 1000 milliGRAM(s) IV Intermittent once    MEDICATIONS  (PRN):        RADIOLOGY & ADDITIONAL TESTS:    < from: Xray Chest 1 View- PORTABLE-Urgent (10.16.19 @ 11:24) >  Heart magnified by AP film shallow inspiration. Streaky   fibrotic/atelectatic change right mid and lower lung field. No gross   focal infiltrate or pleural collection. Right hemodialysis catheter tip   in the right atrium.    < end of copied text > Patient is a 44y old  Female who presents with multiple co-morbidities including ESRD via Right ACW Perm-cath and now presents to the ER for evaluation  of Right arm swelling, pain (16 Oct 2019 16:23). On admission, she has no fever but Leukopenia. She has started on Vancomycin and the ID consult requested to assist with further evaluation and antibiotic management.       REVIEW OF SYSTEMS: Total of twelve systems have been reviewed with patient and found to be negative unless mentioned in HPI        PAST MEDICAL & SURGICAL HISTORY:  HTN (hypertension)  ESRD (end stage renal disease) on dialysis  PE (pulmonary thromboembolism): Aug 2019  DM (diabetes mellitus)  SLE (systemic lupus erythematosus)  No significant past surgical history        SOCIAL HISTORY  Alcohol: Does not drink  Tobacco: Does not smoke  Illicit substance use: None      FAMILY HISTORY: Non contributory to the present illness        ALLERGIES:  No Known Allergies      Vital Signs Last 24 Hrs  T(C): 36.9 (16 Oct 2019 20:30), Max: 36.9 (16 Oct 2019 20:30)  T(F): 98.5 (16 Oct 2019 20:30), Max: 98.5 (16 Oct 2019 20:30)  HR: 89 (16 Oct 2019 20:30) (85 - 90)  BP: 101/60 (16 Oct 2019 20:30) (90/64 - 110/59)  BP(mean): --  RR: 18 (16 Oct 2019 20:30) (15 - 18)  SpO2: 98% (16 Oct 2019 20:30) (97% - 98%)      PHYSICAL EXAM:  GENERAL: Not in distress   CHEST/LUNG:  Air entry bilaterally, RIght  ACW Perm-cath in placed  HEART: s1 and s2 present  ABDOMEN:  Nontender and  Nondistended  EXTREMITIES: RUE swollen, mild erythematous and very tender  CNS: Awake and Alert        LABS:                        8.1    1.02  )-----------( 24       ( 16 Oct 2019 12:45 )             26.2         10-16    134<L>  |  100  |  44<H>  ----------------------------<  163<H>  3.6   |  25  |  6.81<H>    Ca    6.8<L>      16 Oct 2019 12:45  Phos  3.7     10-16  Mg     1.9     10-16    TPro  4.8<L>  /  Alb  1.3<L>  /  TBili  2.3<H>  /  DBili  x   /  AST  94<H>  /  ALT  165<H>  /  AlkPhos  100  10-16    PT/INR - ( 16 Oct 2019 14:13 )   PT: 52.7 sec;   INR: 4.53 ratio      PTT - ( 16 Oct 2019 14:13 )  PTT:70.9 sec          MEDICATIONS  (STANDING):  amLODIPine   Tablet 10 milliGRAM(s) Oral daily  atovaquone Suspension 1500 milliGRAM(s) Oral daily  carvedilol 6.25 milliGRAM(s) Oral every 12 hours  famotidine    Tablet 40 milliGRAM(s) Oral daily  ferrous    sulfate 325 milliGRAM(s) Oral daily  insulin lispro (HumaLOG) corrective regimen sliding scale   SubCutaneous Before meals and at bedtime  methylPREDNISolone 16 milliGRAM(s) Oral daily  sevelamer carbonate 1600 milliGRAM(s) Oral three times a day  vancomycin  IVPB 1000 milliGRAM(s) IV Intermittent once    MEDICATIONS  (PRN):        RADIOLOGY & ADDITIONAL TESTS:    < from: Xray Chest 1 View- PORTABLE-Urgent (10.16.19 @ 11:24) >  Heart magnified by AP film shallow inspiration. Streaky   fibrotic/atelectatic change right mid and lower lung field. No gross   focal infiltrate or pleural collection. Right hemodialysis catheter tip   in the right atrium.    < end of copied text >

## 2019-10-16 NOTE — CONSULT NOTE ADULT - ATTENDING COMMENTS
Patient seen and examined with the Resident    Plan:  Patient states her are is the same as it was on Monday  No numbness, good distal pulses, at this time she does not appear to have compartment syndrome  She will be followed by Vascular  Continue antibiotics as per ID for cellulitis of the RUE  will have repeat CT as well as  arterial and venous dopplers as previously ordered of the RUE  Renal to see patient for HD  She does not require the ICU at this time  Pleas recall if needed

## 2019-10-16 NOTE — H&P ADULT - NSHPPHYSICALEXAM_GEN_ALL_CORE
ICU Vital Signs Last 24 Hrs  T(C): 36.8 (16 Oct 2019 14:53), Max: 36.8 (16 Oct 2019 14:53)  T(F): 98.2 (16 Oct 2019 14:53), Max: 98.2 (16 Oct 2019 14:53)  HR: 90 (16 Oct 2019 14:53) (85 - 90)  BP: 110/59 (16 Oct 2019 14:53) (90/64 - 110/59)  RR: 15 (16 Oct 2019 14:53) (15 - 18)  SpO2: 98% (16 Oct 2019 14:53) (97% - 98%)       PHYSICAL EXAM:  GENERAL: NAD  HEENT: Normocephalic;  conjunctivae and sclerae clear; moist mucous membranes;   NECK : supple  CHEST/LUNG: Clear to auscultation bilaterally with good air entry   HEART: S1 S2  regular; no murmurs, gallops or rubs  ABDOMEN: Soft, Nontender, Nondistended; Bowel sounds present  EXTREMITIES: no cyanosis; no edema; no calf tenderness  SKIN: warm and dry; no rash  NERVOUS SYSTEM:  Awake and alert; Oriented  to place, person and time ; no new deficits ICU Vital Signs Last 24 Hrs  T(C): 36.8 (16 Oct 2019 14:53), Max: 36.8 (16 Oct 2019 14:53)  T(F): 98.2 (16 Oct 2019 14:53), Max: 98.2 (16 Oct 2019 14:53)  HR: 90 (16 Oct 2019 14:53) (85 - 90)  BP: 110/59 (16 Oct 2019 14:53) (90/64 - 110/59)  RR: 15 (16 Oct 2019 14:53) (15 - 18)  SpO2: 98% (16 Oct 2019 14:53) (97% - 98%)       PHYSICAL EXAM:  GENERAL: female in bed   HEENT: Normocephalic;  conjunctivae and sclerae clear; moist mucous membranes;   NECK : supple  CHEST/LUNG: Clear to auscultation bilaterally with good air entry, Right Perma cath placed   HEART: S1 S2  regular; no murmurs, gallops or rubs  ABDOMEN: Soft, Nontender, Nondistended; Bowel sounds present  EXTREMITIES: no cyanosis; no edema; no calf tenderness on Left arm   Right arm: erythema, swollen, tender to touch, one blister present on ventral surface   NERVOUS SYSTEM:  Awake and alert; Oriented  to place, person and time ; no new deficits

## 2019-10-17 LAB
24R-OH-CALCIDIOL SERPL-MCNC: 10.7 NG/ML — LOW (ref 30–80)
ABO RH CONFIRMATION: SIGNIFICANT CHANGE UP
ALBUMIN SERPL ELPH-MCNC: 1.2 G/DL — LOW (ref 3.5–5)
ALP SERPL-CCNC: 122 U/L — HIGH (ref 40–120)
ALT FLD-CCNC: 114 U/L DA — HIGH (ref 10–60)
ANION GAP SERPL CALC-SCNC: 9 MMOL/L — SIGNIFICANT CHANGE UP (ref 5–17)
APTT BLD: 72.4 SEC — HIGH (ref 27.5–36.3)
AST SERPL-CCNC: 62 U/L — HIGH (ref 10–40)
BASOPHILS # BLD AUTO: 0.02 K/UL — SIGNIFICANT CHANGE UP (ref 0–0.2)
BASOPHILS NFR BLD AUTO: 1.2 % — SIGNIFICANT CHANGE UP (ref 0–2)
BILIRUB SERPL-MCNC: 2.5 MG/DL — HIGH (ref 0.2–1.2)
BLD GP AB SCN SERPL QL: SIGNIFICANT CHANGE UP
BUN SERPL-MCNC: 54 MG/DL — HIGH (ref 7–18)
C3 SERPL-MCNC: 28 MG/DL — LOW (ref 81–157)
C4 SERPL-MCNC: 11 MG/DL — LOW (ref 13–39)
CALCIUM SERPL-MCNC: 6.8 MG/DL — LOW (ref 8.4–10.5)
CHLORIDE SERPL-SCNC: 99 MMOL/L — SIGNIFICANT CHANGE UP (ref 96–108)
CHOLEST SERPL-MCNC: 75 MG/DL — SIGNIFICANT CHANGE UP (ref 10–199)
CK SERPL-CCNC: 85 U/L — SIGNIFICANT CHANGE UP (ref 21–215)
CO2 SERPL-SCNC: 25 MMOL/L — SIGNIFICANT CHANGE UP (ref 22–31)
CREAT SERPL-MCNC: 7.64 MG/DL — HIGH (ref 0.5–1.3)
CRP SERPL-MCNC: 27.44 MG/DL — HIGH (ref 0–0.4)
ENA JO1 AB SER-ACNC: <0.2 AI — SIGNIFICANT CHANGE UP
EOSINOPHIL # BLD AUTO: 0 K/UL — SIGNIFICANT CHANGE UP (ref 0–0.5)
EOSINOPHIL NFR BLD AUTO: 0 % — SIGNIFICANT CHANGE UP (ref 0–6)
ERYTHROCYTE [SEDIMENTATION RATE] IN BLOOD: 41 MM/HR — HIGH (ref 0–15)
FOLATE SERPL-MCNC: 4.7 NG/ML — SIGNIFICANT CHANGE UP
GLUCOSE BLDC GLUCOMTR-MCNC: 108 MG/DL — HIGH (ref 70–99)
GLUCOSE BLDC GLUCOMTR-MCNC: 117 MG/DL — HIGH (ref 70–99)
GLUCOSE BLDC GLUCOMTR-MCNC: 139 MG/DL — HIGH (ref 70–99)
GLUCOSE BLDC GLUCOMTR-MCNC: 144 MG/DL — HIGH (ref 70–99)
GLUCOSE SERPL-MCNC: 135 MG/DL — HIGH (ref 70–99)
HBA1C BLD-MCNC: 8 % — HIGH (ref 4–5.6)
HBV SURFACE AB SER-ACNC: REACTIVE
HBV SURFACE AG SER-ACNC: SIGNIFICANT CHANGE UP
HCT VFR BLD CALC: 23.1 % — LOW (ref 34.5–45)
HCT VFR BLD CALC: 24.5 % — LOW (ref 34.5–45)
HDLC SERPL-MCNC: 12 MG/DL — LOW
HGB BLD-MCNC: 7.3 G/DL — LOW (ref 11.5–15.5)
HGB BLD-MCNC: 7.7 G/DL — LOW (ref 11.5–15.5)
INR BLD: 5.86 RATIO — CRITICAL HIGH (ref 0.88–1.16)
LIPID PNL WITH DIRECT LDL SERPL: 20 MG/DL — SIGNIFICANT CHANGE UP
LYMPHOCYTES # BLD AUTO: 0.15 K/UL — LOW (ref 1–3.3)
LYMPHOCYTES # BLD AUTO: 8.2 % — LOW (ref 13–44)
MAGNESIUM SERPL-MCNC: 2.1 MG/DL — SIGNIFICANT CHANGE UP (ref 1.6–2.6)
MCHC RBC-ENTMCNC: 27.2 PG — SIGNIFICANT CHANGE UP (ref 27–34)
MCHC RBC-ENTMCNC: 27.3 PG — SIGNIFICANT CHANGE UP (ref 27–34)
MCHC RBC-ENTMCNC: 31.4 GM/DL — LOW (ref 32–36)
MCHC RBC-ENTMCNC: 31.6 GM/DL — LOW (ref 32–36)
MCV RBC AUTO: 86.5 FL — SIGNIFICANT CHANGE UP (ref 80–100)
MCV RBC AUTO: 86.6 FL — SIGNIFICANT CHANGE UP (ref 80–100)
MONOCYTES # BLD AUTO: 0.23 K/UL — SIGNIFICANT CHANGE UP (ref 0–0.9)
MONOCYTES NFR BLD AUTO: 12.9 % — SIGNIFICANT CHANGE UP (ref 2–14)
NEUTROPHILS # BLD AUTO: 1.23 K/UL — LOW (ref 1.8–7.4)
NEUTROPHILS NFR BLD AUTO: 69.5 % — SIGNIFICANT CHANGE UP (ref 43–77)
NRBC # BLD: 0 /100 WBCS — SIGNIFICANT CHANGE UP (ref 0–0)
PHOSPHATE SERPL-MCNC: 4.2 MG/DL — SIGNIFICANT CHANGE UP (ref 2.5–4.5)
PLATELET # BLD AUTO: 17 K/UL — CRITICAL LOW (ref 150–400)
PLATELET # BLD AUTO: 22 K/UL — LOW (ref 150–400)
POTASSIUM SERPL-MCNC: 3.8 MMOL/L — SIGNIFICANT CHANGE UP (ref 3.5–5.3)
POTASSIUM SERPL-SCNC: 3.8 MMOL/L — SIGNIFICANT CHANGE UP (ref 3.5–5.3)
PROT SERPL-MCNC: 4.9 G/DL — LOW (ref 6–8.3)
PROTHROM AB SERPL-ACNC: 68.7 SEC — HIGH (ref 10–12.9)
RBC # BLD: 2.67 M/UL — LOW (ref 3.8–5.2)
RBC # BLD: 2.83 M/UL — LOW (ref 3.8–5.2)
RBC # FLD: 19.4 % — HIGH (ref 10.3–14.5)
RBC # FLD: 19.6 % — HIGH (ref 10.3–14.5)
SODIUM SERPL-SCNC: 133 MMOL/L — LOW (ref 135–145)
TOTAL CHOLESTEROL/HDL RATIO MEASUREMENT: 6.2 RATIO — SIGNIFICANT CHANGE UP (ref 3.3–7.1)
TRIGL SERPL-MCNC: 213 MG/DL — HIGH (ref 10–149)
TSH SERPL-MCNC: 0.29 UU/ML — LOW (ref 0.34–4.82)
VIT B12 SERPL-MCNC: >2000 PG/ML — HIGH (ref 232–1245)
WBC # BLD: 1.77 K/UL — LOW (ref 3.8–10.5)
WBC # BLD: 2.99 K/UL — LOW (ref 3.8–10.5)
WBC # FLD AUTO: 1.77 K/UL — LOW (ref 3.8–10.5)
WBC # FLD AUTO: 2.99 K/UL — LOW (ref 3.8–10.5)

## 2019-10-17 PROCEDURE — 93971 EXTREMITY STUDY: CPT | Mod: 26,RT

## 2019-10-17 PROCEDURE — 71275 CT ANGIOGRAPHY CHEST: CPT | Mod: 26

## 2019-10-17 RX ORDER — ERYTHROPOIETIN 10000 [IU]/ML
10000 INJECTION, SOLUTION INTRAVENOUS; SUBCUTANEOUS
Refills: 0 | Status: DISCONTINUED | OUTPATIENT
Start: 2019-10-17 | End: 2019-10-21

## 2019-10-17 RX ORDER — PANTOPRAZOLE SODIUM 20 MG/1
40 TABLET, DELAYED RELEASE ORAL DAILY
Refills: 0 | Status: DISCONTINUED | OUTPATIENT
Start: 2019-10-17 | End: 2019-10-17

## 2019-10-17 RX ORDER — CHLORHEXIDINE GLUCONATE 213 G/1000ML
1 SOLUTION TOPICAL DAILY
Refills: 0 | Status: DISCONTINUED | OUTPATIENT
Start: 2019-10-17 | End: 2019-10-17

## 2019-10-17 RX ORDER — HUMAN INSULIN 100 [IU]/ML
0 INJECTION, SUSPENSION SUBCUTANEOUS
Qty: 0 | Refills: 0 | DISCHARGE

## 2019-10-17 RX ORDER — FERROUS SULFATE 325(65) MG
0 TABLET ORAL
Qty: 0 | Refills: 0 | DISCHARGE

## 2019-10-17 RX ORDER — PHYTONADIONE (VIT K1) 5 MG
2.5 TABLET ORAL ONCE
Refills: 0 | Status: COMPLETED | OUTPATIENT
Start: 2019-10-17 | End: 2019-10-17

## 2019-10-17 RX ORDER — PANTOPRAZOLE SODIUM 20 MG/1
40 TABLET, DELAYED RELEASE ORAL
Refills: 0 | Status: DISCONTINUED | OUTPATIENT
Start: 2019-10-17 | End: 2019-10-17

## 2019-10-17 RX ORDER — CEFAZOLIN SODIUM 1 G
1000 VIAL (EA) INJECTION EVERY 24 HOURS
Refills: 0 | Status: DISCONTINUED | OUTPATIENT
Start: 2019-10-18 | End: 2019-10-21

## 2019-10-17 RX ORDER — WARFARIN SODIUM 2.5 MG/1
1 TABLET ORAL
Qty: 0 | Refills: 0 | DISCHARGE

## 2019-10-17 RX ORDER — CEFAZOLIN SODIUM 1 G
1000 VIAL (EA) INJECTION ONCE
Refills: 0 | Status: COMPLETED | OUTPATIENT
Start: 2019-10-17 | End: 2019-10-17

## 2019-10-17 RX ORDER — CEFAZOLIN SODIUM 1 G
VIAL (EA) INJECTION
Refills: 0 | Status: DISCONTINUED | OUTPATIENT
Start: 2019-10-17 | End: 2019-10-21

## 2019-10-17 RX ORDER — PANTOPRAZOLE SODIUM 20 MG/1
40 TABLET, DELAYED RELEASE ORAL DAILY
Refills: 0 | Status: DISCONTINUED | OUTPATIENT
Start: 2019-10-17 | End: 2019-10-21

## 2019-10-17 RX ORDER — SEVELAMER CARBONATE 2400 MG/1
2 POWDER, FOR SUSPENSION ORAL
Qty: 0 | Refills: 0 | DISCHARGE

## 2019-10-17 RX ORDER — CHLORHEXIDINE GLUCONATE 213 G/1000ML
1 SOLUTION TOPICAL DAILY
Refills: 0 | Status: DISCONTINUED | OUTPATIENT
Start: 2019-10-17 | End: 2019-10-21

## 2019-10-17 RX ORDER — VANCOMYCIN HCL 1 G
500 VIAL (EA) INTRAVENOUS
Refills: 0 | Status: DISCONTINUED | OUTPATIENT
Start: 2019-10-17 | End: 2019-10-21

## 2019-10-17 RX ADMIN — Medication 480 MICROGRAM(S): at 15:43

## 2019-10-17 RX ADMIN — SEVELAMER CARBONATE 1600 MILLIGRAM(S): 2400 POWDER, FOR SUSPENSION ORAL at 06:26

## 2019-10-17 RX ADMIN — ERYTHROPOIETIN 10000 UNIT(S): 10000 INJECTION, SOLUTION INTRAVENOUS; SUBCUTANEOUS at 20:27

## 2019-10-17 RX ADMIN — Medication 258 MILLIGRAM(S): at 15:50

## 2019-10-17 RX ADMIN — SEVELAMER CARBONATE 1600 MILLIGRAM(S): 2400 POWDER, FOR SUSPENSION ORAL at 01:30

## 2019-10-17 RX ADMIN — Medication 2.5 MILLIGRAM(S): at 15:57

## 2019-10-17 RX ADMIN — PANTOPRAZOLE SODIUM 40 MILLIGRAM(S): 20 TABLET, DELAYED RELEASE ORAL at 15:58

## 2019-10-17 RX ADMIN — Medication 325 MILLIGRAM(S): at 17:25

## 2019-10-17 RX ADMIN — CHLORHEXIDINE GLUCONATE 1 APPLICATION(S): 213 SOLUTION TOPICAL at 11:33

## 2019-10-17 RX ADMIN — Medication 100 MILLIGRAM(S): at 23:33

## 2019-10-17 RX ADMIN — ATOVAQUONE 1500 MILLIGRAM(S): 750 SUSPENSION ORAL at 15:47

## 2019-10-17 RX ADMIN — PANTOPRAZOLE SODIUM 40 MILLIGRAM(S): 20 TABLET, DELAYED RELEASE ORAL at 06:27

## 2019-10-17 RX ADMIN — SEVELAMER CARBONATE 1600 MILLIGRAM(S): 2400 POWDER, FOR SUSPENSION ORAL at 17:25

## 2019-10-17 RX ADMIN — Medication 50 MILLIGRAM(S): at 08:13

## 2019-10-17 RX ADMIN — Medication 480 MICROGRAM(S): at 01:29

## 2019-10-17 RX ADMIN — SEVELAMER CARBONATE 1600 MILLIGRAM(S): 2400 POWDER, FOR SUSPENSION ORAL at 21:52

## 2019-10-17 RX ADMIN — PANTOPRAZOLE SODIUM 40 MILLIGRAM(S): 20 TABLET, DELAYED RELEASE ORAL at 12:18

## 2019-10-17 NOTE — ADVANCED PRACTICE NURSE CONSULT - RECOMMEDATIONS
-Clean all wounds with normal saline and apply skin prep to the surrounding skin  -Apply MediHoney ointment to the L. Post Thigh and L. Gluteal wounds and cover with a Foam dressing Q 48hrs PRN  -Encourage the patient to reposition Q 2hrs using wedges or pillows

## 2019-10-17 NOTE — PROGRESS NOTE ADULT - SUBJECTIVE AND OBJECTIVE BOX
INTERVAL HPI/OVERNIGHT EVENTS:  Pt seen and examined at bedside, feeling anxious, states she wants the PC out. Going to CT.   RUE swelling unchanged.    MEDICATIONS  (STANDING):  atovaquone Suspension 1500 milliGRAM(s) Oral daily  epoetin renita Injectable 69327 Unit(s) IV Push <User Schedule>  ferrous    sulfate 325 milliGRAM(s) Oral daily  filgrastim-sndz Injectable 480 MICROGram(s) SubCutaneous daily  hydrocortisone sodium succinate Injectable 50 milliGRAM(s) IV Push every 8 hours  insulin lispro (HumaLOG) corrective regimen sliding scale   SubCutaneous Before meals and at bedtime  pantoprazole    Tablet 40 milliGRAM(s) Oral before breakfast  sevelamer carbonate 1600 milliGRAM(s) Oral three times a day  vancomycin  IVPB 500 milliGRAM(s) IV Intermittent <User Schedule>    MEDICATIONS  (PRN):  acetaminophen   Tablet .. 650 milliGRAM(s) Oral every 6 hours PRN Temp greater or equal to 38C (100.4F), Mild Pain (1 - 3)  traMADol 25 milliGRAM(s) Oral every 12 hours PRN Severe Pain (7 - 10)      Vital Signs Last 24 Hrs  T(C): 37.1 (17 Oct 2019 04:45), Max: 37.1 (17 Oct 2019 04:45)  T(F): 98.7 (17 Oct 2019 04:45), Max: 98.7 (17 Oct 2019 04:45)  HR: 88 (17 Oct 2019 04:45) (85 - 90)  BP: 117/65 (17 Oct 2019 04:45) (90/64 - 117/65)  RR: 18 (17 Oct 2019 04:45) (15 - 18)  SpO2: 96% (17 Oct 2019 04:45) (96% - 98%)    Physical:  Gen: alert, awake, in no acute distress  Abd: obese, soft, NT, ND  Ext: RUE: significantly edema noted from right shoulder to fingers, unchanged from yesterday; distal pulses palpable, NV intact. LUE normal.  Skin: multiple small petechiae diffusely throughout abdomen and groin      LABS:                        7.3    1.77  )-----------( 17       ( 17 Oct 2019 08:07 )             23.1             10-17    133<L>  |  99  |  54<H>  ----------------------------<  135<H>  3.8   |  25  |  7.64<H>    Ca    6.8<L>      17 Oct 2019 08:07  Phos  4.2     10-17  Mg     2.1     10-17    TPro  4.9<L>  /  Alb  1.2<L>  /  TBili  2.5<H>  /  DBili  x   /  AST  62<H>  /  ALT  114<H>  /  AlkPhos  122<H>  10-17 INTERVAL HPI/OVERNIGHT EVENTS:  Pt seen and examined at bedside, feeling anxious, states she wants the PC out. Going to CT.   RUE swelling unchanged.    MEDICATIONS  (STANDING):  atovaquone Suspension 1500 milliGRAM(s) Oral daily  epoetin renita Injectable 73575 Unit(s) IV Push <User Schedule>  ferrous    sulfate 325 milliGRAM(s) Oral daily  filgrastim-sndz Injectable 480 MICROGram(s) SubCutaneous daily  hydrocortisone sodium succinate Injectable 50 milliGRAM(s) IV Push every 8 hours  insulin lispro (HumaLOG) corrective regimen sliding scale   SubCutaneous Before meals and at bedtime  pantoprazole    Tablet 40 milliGRAM(s) Oral before breakfast  sevelamer carbonate 1600 milliGRAM(s) Oral three times a day  vancomycin  IVPB 500 milliGRAM(s) IV Intermittent <User Schedule>    MEDICATIONS  (PRN):  acetaminophen   Tablet .. 650 milliGRAM(s) Oral every 6 hours PRN Temp greater or equal to 38C (100.4F), Mild Pain (1 - 3)  traMADol 25 milliGRAM(s) Oral every 12 hours PRN Severe Pain (7 - 10)      Vital Signs Last 24 Hrs  T(C): 37.1 (17 Oct 2019 04:45), Max: 37.1 (17 Oct 2019 04:45)  T(F): 98.7 (17 Oct 2019 04:45), Max: 98.7 (17 Oct 2019 04:45)  HR: 88 (17 Oct 2019 04:45) (85 - 90)  BP: 117/65 (17 Oct 2019 04:45) (90/64 - 117/65)  RR: 18 (17 Oct 2019 04:45) (15 - 18)  SpO2: 96% (17 Oct 2019 04:45) (96% - 98%)    Physical:  Gen: alert, awake, in no acute distress  Abd: obese, soft, NT, ND  Ext: RUE: significantly edema noted from right shoulder to fingers, unchanged from yesterday; distal pulses palpable, NV intact. LUE normal.  Skin: multiple small petechiae diffusely throughout abdomen and groin      LABS:                        7.3    1.77  )-----------( 17       ( 17 Oct 2019 08:07 )             23.1             10-17    133<L>  |  99  |  54<H>  ----------------------------<  135<H>  3.8   |  25  |  7.64<H>    Ca    6.8<L>      17 Oct 2019 08:07  Phos  4.2     10-17  Mg     2.1     10-17    TPro  4.9<L>  /  Alb  1.2<L>  /  TBili  2.5<H>  /  DBili  x   /  AST  62<H>  /  ALT  114<H>  /  AlkPhos  122<H>  10-17    PT/INR - ( 16 Oct 2019 14:13 )   PT: 52.7 sec;   INR: 4.53 ratio    PTT - ( 16 Oct 2019 14:13 )  PTT:70.9 sec

## 2019-10-17 NOTE — PROGRESS NOTE ADULT - ASSESSMENT
44 year old female with malfunctioning permacath, RUE edema, with INR of 4.53, Cr 6.81, plt 24    - f/u RUE venous duplex and CTA  - hold off on PC removal  - hold off on shiley placement  - get INR under better control  - recommend transferring to [facility] for advanced medical care 44 year old female with malfunctioning permacath, RUE edema, with INR of 4.53, Cr 7.64 , plt 23.1    - f/u CTA  - f/u RUE venous duplex  - hold off on PC removal  - hold off on shiley placement  - get INR under better control  - recommend transferring to [facility] for advanced medical care

## 2019-10-17 NOTE — PROGRESS NOTE ADULT - ASSESSMENT
Patient is a 44y old  Female who presents with multiple co-morbidities including ESRD via Right ACW Perm-cath and now presents to the ER for evaluation  of Right arm swelling, pain (16 Oct 2019 16:23). On admission, she has no fever but Leukopenia. She has started on Vancomycin and the ID consult requested to assist with further evaluation and antibiotic management.     # RUE Cellulitis -in the setting of Perm-cath at right ACW    would recommend:    1. Continue Vancomycin and keep level between 15 to 20 and Add Cefazolin  2. Keep RUE  elevated  3. Need to removal of Perm-cath   4. Follow up blood cultures   5. Pain management as needed    d/w patient and Nursing staff    will follow the patient with you Patient is a 44y old  Female who presents with multiple co-morbidities including ESRD via Right ACW Perm-cath and now presents to the ER for evaluation  of Right arm swelling, pain (16 Oct 2019 16:23). On admission, she has no fever but Leukopenia. She has started on Vancomycin and the ID consult requested to assist with further evaluation and antibiotic management.     # RUE Cellulitis -in the setting of Perm-cath at right ACW    would recommend:    1. Add Cefazolin 1 g daily and Continue Vancomycin   2. Monitor and keep level between 15 to 20   3. Need to removal of Perm-cath   4. Pain management as needed  5. Keep RUE  elevated    d/w patient and House staff    will follow the patient with you

## 2019-10-17 NOTE — ADVANCED PRACTICE NURSE CONSULT - ASSESSMENT
This is a 44yr old female patient admitted for Cellulitis, presenting with the following:  -There is evidence of a generalized purplish rash throughout the patients entire body  -There is a DTI to the L. Gluteus (3cm x 1cm x 0.2cm) and L. Post Thigh (0.5cm x 3.5cm x 0.1cm) with epidermal separation, pink tissue, pale tissue, and no drainage

## 2019-10-17 NOTE — PROGRESS NOTE ADULT - SUBJECTIVE AND OBJECTIVE BOX
PGY 1 Note discussed with supervising resident and primary attending    Patient is a 44y old  Female who presents with a chief complaint of Right arm swelling, pain/pancytopenia (17 Oct 2019 10:41)      INTERVAL HPI/OVERNIGHT EVENTS:  Patient seen and examined at bed side, complaining of r arm pain, on vanco and atovaqoune, scheduled for dialysis today, will get 1 unit of PRBC during dialyisis.    MEDICATIONS  (STANDING):  atovaquone Suspension 1500 milliGRAM(s) Oral daily  chlorhexidine 2% Cloths 1 Application(s) Topical daily  epoetin renita Injectable 36013 Unit(s) IV Push <User Schedule>  ferrous    sulfate 325 milliGRAM(s) Oral daily  filgrastim-sndz Injectable 480 MICROGram(s) SubCutaneous daily  hydrocortisone sodium succinate Injectable 50 milliGRAM(s) IV Push every 8 hours  insulin lispro (HumaLOG) corrective regimen sliding scale   SubCutaneous Before meals and at bedtime  pantoprazole    Tablet 40 milliGRAM(s) Oral before breakfast  phytonadione   Solution 2.5 milliGRAM(s) Oral once  sevelamer carbonate 1600 milliGRAM(s) Oral three times a day  vancomycin  IVPB 500 milliGRAM(s) IV Intermittent <User Schedule>    MEDICATIONS  (PRN):  acetaminophen   Tablet .. 650 milliGRAM(s) Oral every 6 hours PRN Temp greater or equal to 38C (100.4F), Mild Pain (1 - 3)  traMADol 25 milliGRAM(s) Oral every 12 hours PRN Severe Pain (7 - 10)      __________________________________________________  REVIEW OF SYSTEMS:    CONSTITUTIONAL: No fever,   EYES: no acute visual disturbances  NECK: No pain or stiffness  RESPIRATORY: No cough; No shortness of breath  CARDIOVASCULAR: No chest pain, no palpitations  GASTROINTESTINAL: No pain. No nausea or vomiting; No diarrhea   NEUROLOGICAL: No headache or numbness, no tremors  MUSCULOSKELETAL: Pain in R arm.  GENITOURINARY: no dysuria, no frequency, no hesitancy  PSYCHIATRY: no depression , no anxiety  ALL OTHER  ROS negative        Vital Signs Last 24 Hrs  T(C): 37.1 (17 Oct 2019 04:45), Max: 37.1 (17 Oct 2019 04:45)  T(F): 98.7 (17 Oct 2019 04:45), Max: 98.7 (17 Oct 2019 04:45)  HR: 88 (17 Oct 2019 04:45) (88 - 90)  BP: 117/65 (17 Oct 2019 04:45) (101/60 - 117/65)  BP(mean): --  RR: 18 (17 Oct 2019 04:45) (15 - 18)  SpO2: 96% (17 Oct 2019 04:45) (96% - 98%)    ________________________________________________  PHYSICAL EXAM:  GENERAL: obese female  complained of R arm pain.  HEENT: Normocephalic;  conjunctivae and sclerae clear; moist mucous membranes;   NECK : supple  CHEST/LUNG: Clear to auscultation bilaterally with good air entry   HEART: S1 S2  regular; no murmurs, gallops or rubs  ABDOMEN: Soft, Nontender, Nondistended; Bowel sounds present  EXTREMITIES: edema of R upper extremity , red, tender .  SKIN: warm and dry; no rash  NERVOUS SYSTEM:  Awake and alert; Oriented  to place, person and time     _________________________________________________  LABS:                        7.3    1.77  )-----------( 17       ( 17 Oct 2019 08:07 )             23.1     10-17    133<L>  |  99  |  54<H>  ----------------------------<  135<H>  3.8   |  25  |  7.64<H>    Ca    6.8<L>      17 Oct 2019 08:07  Phos  4.2     10-17  Mg     2.1     10-17    TPro  4.9<L>  /  Alb  1.2<L>  /  TBili  2.5<H>  /  DBili  x   /  AST  62<H>  /  ALT  114<H>  /  AlkPhos  122<H>  10-17    PT/INR - ( 17 Oct 2019 08:07 )   PT: 68.7 sec;   INR: 5.86 ratio         PTT - ( 17 Oct 2019 08:07 )  PTT:72.4 sec    CAPILLARY BLOOD GLUCOSE      POCT Blood Glucose.: 139 mg/dL (17 Oct 2019 07:53)  POCT Blood Glucose.: 142 mg/dL (16 Oct 2019 22:44)        RADIOLOGY & ADDITIONAL TESTS:    Imaging Personally Reviewed:  YES  Consultant(s) Notes Reviewed:   YES  Care Discussed with Consultants :     Plan of care was discussed with patient and /or primary care giver; all questions and concerns were addressed and care was aligned with patient's wishes.

## 2019-10-17 NOTE — PROGRESS NOTE ADULT - PROBLEM SELECTOR PLAN 6
on coumadin forPE since August   -INR: 4.5, hold coumadin 3 mg tonight   monitor INR in AM   restart coumadin once INR is therapeutic on coumadin for PE .  INR 5.56  one dose of vit K 2.5 PO.    restart coumadin once INR is therapeutic

## 2019-10-17 NOTE — PROGRESS NOTE ADULT - SUBJECTIVE AND OBJECTIVE BOX
Patient is seen and examined at the bed side, is afebrile.        REVIEW OF SYSTEMS: All other review systems are negative      ALLERGIES:  No Known Allergies      Vital Signs Last 24 Hrs  T(C): 36.1 (17 Oct 2019 13:36), Max: 37.1 (17 Oct 2019 04:45)  T(F): 97 (17 Oct 2019 13:36), Max: 98.7 (17 Oct 2019 04:45)  HR: 86 (17 Oct 2019 13:36) (86 - 90)  BP: 116/63 (17 Oct 2019 13:36) (101/60 - 117/65)  BP(mean): --  RR: 16 (17 Oct 2019 13:36) (16 - 18)  SpO2: 96% (17 Oct 2019 13:36) (96% - 98%)      PHYSICAL EXAM:  GENERAL: Not in distress   CHEST/LUNG:  Air entry bilaterally, RIght  ACW Perm-cath in placed  HEART: s1 and s2 present  ABDOMEN:  Nontender and  Nondistended  EXTREMITIES: RUE swollen, mild erythematous and very tender  CNS: Awake and Alert        LABS:                        7.3    1.77  )-----------( 17       ( 17 Oct 2019 08:07 )             23.1                           8.1    1.02  )-----------( 24       ( 16 Oct 2019 12:45 )             26.2       10-17    133<L>  |  99  |  54<H>  ----------------------------<  135<H>  3.8   |  25  |  7.64<H>    Ca    6.8<L>      17 Oct 2019 08:07  Phos  4.2     10-17  Mg     2.1     10-17    TPro  4.9<L>  /  Alb  1.2<L>  /  TBili  2.5<H>  /  DBili  x   /  AST  62<H>  /  ALT  114<H>  /  AlkPhos  122<H>  10-17    10-16    134<L>  |  100  |  44<H>  ----------------------------<  163<H>  3.6   |  25  |  6.81<H>    Ca    6.8<L>      16 Oct 2019 12:45  Phos  3.7     10-16  Mg     1.9     10-16    TPro  4.8<L>  /  Alb  1.3<L>  /  TBili  2.3<H>  /  DBili  x   /  AST  94<H>  /  ALT  165<H>  /  AlkPhos  100  10-16    PT/INR - ( 16 Oct 2019 14:13 )   PT: 52.7 sec;   INR: 4.53 ratio      PTT - ( 16 Oct 2019 14:13 )  PTT:70.9 sec          MEDICATIONS  (STANDING):  atovaquone Suspension 1500 milliGRAM(s) Oral daily  chlorhexidine 2% Cloths 1 Application(s) Topical daily  epoetin renita Injectable 38262 Unit(s) IV Push <User Schedule>  ferrous    sulfate 325 milliGRAM(s) Oral daily  filgrastim-sndz Injectable 480 MICROGram(s) SubCutaneous daily  insulin lispro (HumaLOG) corrective regimen sliding scale   SubCutaneous Before meals and at bedtime  methylPREDNISolone sodium succinate IVPB 1000 milliGRAM(s) IV Intermittent daily  pantoprazole  Injectable 40 milliGRAM(s) IV Push daily  sevelamer carbonate 1600 milliGRAM(s) Oral three times a day  vancomycin  IVPB 500 milliGRAM(s) IV Intermittent <User Schedule>    MEDICATIONS  (PRN):  acetaminophen   Tablet .. 650 milliGRAM(s) Oral every 6 hours PRN Temp greater or equal to 38C (100.4F), Mild Pain (1 - 3)  traMADol 25 milliGRAM(s) Oral every 12 hours PRN Severe Pain (7 - 10)        RADIOLOGY & ADDITIONAL TESTS:    < from: Xray Chest 1 View- PORTABLE-Urgent (10.16.19 @ 11:24) >  Heart magnified by AP film shallow inspiration. Streaky   fibrotic/atelectatic change right mid and lower lung field. No gross   focal infiltrate or pleural collection. Right hemodialysis catheter tip   in the right atrium.    < end of copied text > Patient is seen and examined at the bed side, is afebrile. She is feeling same, now forming a blister at right hand, stay swollen and erythematous some parts of it. The blood cultures have no growth to date.         REVIEW OF SYSTEMS: All other review systems are negative      ALLERGIES:  No Known Allergies      Vital Signs Last 24 Hrs  T(C): 36.1 (17 Oct 2019 13:36), Max: 37.1 (17 Oct 2019 04:45)  T(F): 97 (17 Oct 2019 13:36), Max: 98.7 (17 Oct 2019 04:45)  HR: 86 (17 Oct 2019 13:36) (86 - 90)  BP: 116/63 (17 Oct 2019 13:36) (101/60 - 117/65)  BP(mean): --  RR: 16 (17 Oct 2019 13:36) (16 - 18)  SpO2: 96% (17 Oct 2019 13:36) (96% - 98%)        PHYSICAL EXAM:  GENERAL: Not in distress   CHEST/LUNG:  Air entry bilaterally, RIght  ACW Perm-cath in placed  HEART: s1 and s2 present  ABDOMEN:  Nontender and  Nondistended  EXTREMITIES: RUE swollen, mild erythematous and very tender  CNS: Awake and Alert        LABS:                        7.3    1.77  )-----------( 17       ( 17 Oct 2019 08:07 )             23.1                           8.1    1.02  )-----------( 24       ( 16 Oct 2019 12:45 )             26.2       10-17    133<L>  |  99  |  54<H>  ----------------------------<  135<H>  3.8   |  25  |  7.64<H>    Ca    6.8<L>      17 Oct 2019 08:07  Phos  4.2     10-17  Mg     2.1     10-17    TPro  4.9<L>  /  Alb  1.2<L>  /  TBili  2.5<H>  /  DBili  x   /  AST  62<H>  /  ALT  114<H>  /  AlkPhos  122<H>  10-17    10-16    134<L>  |  100  |  44<H>  ----------------------------<  163<H>  3.6   |  25  |  6.81<H>    Ca    6.8<L>      16 Oct 2019 12:45  Phos  3.7     10-16  Mg     1.9     10-16    TPro  4.8<L>  /  Alb  1.3<L>  /  TBili  2.3<H>  /  DBili  x   /  AST  94<H>  /  ALT  165<H>  /  AlkPhos  100  10-16    PT/INR - ( 16 Oct 2019 14:13 )   PT: 52.7 sec;   INR: 4.53 ratio      PTT - ( 16 Oct 2019 14:13 )  PTT:70.9 sec          MEDICATIONS  (STANDING):  atovaquone Suspension 1500 milliGRAM(s) Oral daily  chlorhexidine 2% Cloths 1 Application(s) Topical daily  epoetin renita Injectable 46230 Unit(s) IV Push <User Schedule>  ferrous    sulfate 325 milliGRAM(s) Oral daily  filgrastim-sndz Injectable 480 MICROGram(s) SubCutaneous daily  insulin lispro (HumaLOG) corrective regimen sliding scale   SubCutaneous Before meals and at bedtime  methylPREDNISolone sodium succinate IVPB 1000 milliGRAM(s) IV Intermittent daily  pantoprazole  Injectable 40 milliGRAM(s) IV Push daily  sevelamer carbonate 1600 milliGRAM(s) Oral three times a day  vancomycin  IVPB 500 milliGRAM(s) IV Intermittent <User Schedule>    MEDICATIONS  (PRN):  acetaminophen   Tablet .. 650 milliGRAM(s) Oral every 6 hours PRN Temp greater or equal to 38C (100.4F), Mild Pain (1 - 3)  traMADol 25 milliGRAM(s) Oral every 12 hours PRN Severe Pain (7 - 10)        RADIOLOGY & ADDITIONAL TESTS:    < from: Xray Chest 1 View- PORTABLE-Urgent (10.16.19 @ 11:24) >  Heart magnified by AP film shallow inspiration. Streaky   fibrotic/atelectatic change right mid and lower lung field. No gross   focal infiltrate or pleural collection. Right hemodialysis catheter tip   in the right atrium.    < end of copied text >        MICROBIOLOGY DATA:      Culture - Blood (10.16.19 @ 19:00)    Specimen Source: .Blood    Culture Results:   No growth to date.      Culture - Blood (10.16.19 @ 19:00)    Specimen Source: .Blood    Culture Results:   No growth to date.

## 2019-10-17 NOTE — PROGRESS NOTE ADULT - SUBJECTIVE AND OBJECTIVE BOX
no fever  pain a little less at right arm  less tense swelling  fingers are warm and pink  no sob or bleeding    MEDICATIONS  (STANDING):  atovaquone Suspension 1500 milliGRAM(s) Oral daily  ceFAZolin   IVPB      ceFAZolin   IVPB 1000 milliGRAM(s) IV Intermittent once  chlorhexidine 2% Cloths 1 Application(s) Topical daily  epoetin renita Injectable 16933 Unit(s) IV Push <User Schedule>  ferrous    sulfate 325 milliGRAM(s) Oral daily  filgrastim-sndz Injectable 480 MICROGram(s) SubCutaneous daily  insulin lispro (HumaLOG) corrective regimen sliding scale   SubCutaneous Before meals and at bedtime  methylPREDNISolone sodium succinate IVPB 1000 milliGRAM(s) IV Intermittent daily  pantoprazole  Injectable 40 milliGRAM(s) IV Push daily  sevelamer carbonate 1600 milliGRAM(s) Oral three times a day  vancomycin  IVPB 500 milliGRAM(s) IV Intermittent <User Schedule>    MEDICATIONS  (PRN):  acetaminophen   Tablet .. 650 milliGRAM(s) Oral every 6 hours PRN Temp greater or equal to 38C (100.4F), Mild Pain (1 - 3)  traMADol 25 milliGRAM(s) Oral every 12 hours PRN Severe Pain (7 - 10)      Allergies    No Known Allergies    Intolerances        Vital Signs Last 24 Hrs  T(C): 36.1 (17 Oct 2019 13:36), Max: 37.1 (17 Oct 2019 04:45)  T(F): 97 (17 Oct 2019 13:36), Max: 98.7 (17 Oct 2019 04:45)  HR: 86 (17 Oct 2019 13:36) (86 - 90)  BP: 116/63 (17 Oct 2019 13:36) (101/60 - 117/65)  BP(mean): --  RR: 16 (17 Oct 2019 13:36) (16 - 18)  SpO2: 96% (17 Oct 2019 13:36) (96% - 98%)    PHYSICAL EXAM  General: adult in NAD  HEENT: clear oropharynx, anicteric sclera, pink conjunctiva  Neck: supple  CV: normal S1/S2 with no murmur rubs or gallops  Lungs: positive air movement b/l ant lungs,clear to auscultation, no wheezes, no rales  Abdomen: soft non-tender non-distended, no hepatosplenomegaly  Ext: no clubbing cyanosis or edema  Skin: no rashes and no petechiae  Neuro: alert and oriented X 4, no focal deficits  LABS:                          7.7    2.99  )-----------( 22       ( 17 Oct 2019 18:03 )             24.5         Mean Cell Volume : 86.6 fl  Mean Cell Hemoglobin : 27.2 pg  Mean Cell Hemoglobin Concentration : 31.4 gm/dL  Auto Neutrophil # : x  Auto Lymphocyte # : x  Auto Monocyte # : x  Auto Eosinophil # : x  Auto Basophil # : x  Auto Neutrophil % : x  Auto Lymphocyte % : x  Auto Monocyte % : x  Auto Eosinophil % : x  Auto Basophil % : x    Serial CBC  Hematocrit 24.5  Hemoglobin 7.7  Plat 22  RBC 2.83  WBC 2.99  Serial CBC  Hematocrit 23.1  Hemoglobin 7.3  Plat 17  RBC 2.67  WBC 1.77  Serial CBC  Hematocrit 26.2  Hemoglobin 8.1  Plat 24  RBC 2.99  WBC 1.02    10-17    133<L>  |  99  |  54<H>  ----------------------------<  135<H>  3.8   |  25  |  7.64<H>    Ca    6.8<L>      17 Oct 2019 08:07  Phos  4.2     10-17  Mg     2.1     10-17    TPro  4.9<L>  /  Alb  1.2<L>  /  TBili  2.5<H>  /  DBili  x   /  AST  62<H>  /  ALT  114<H>  /  AlkPhos  122<H>  10-17      PT/INR - ( 17 Oct 2019 08:07 )   PT: 68.7 sec;   INR: 5.86 ratio         PTT - ( 17 Oct 2019 08:07 )  PTT:72.4 sec    Vitamin B12, Serum: >2000 pg/mL (10-17 @ 10:14)  Folate, Serum: 4.7 ng/mL (10-17 @ 10:14)            BLOOD SMEAR INTERPRETATION:       RADIOLOGY & ADDITIONAL STUDIES:

## 2019-10-17 NOTE — CONSULT NOTE ADULT - ASSESSMENT
Patient is a 44y Female with SLE. Was admitted in [facility] [place] in August with lupus flare, BRIAN ( baseline SCR 1.3). A renal biopsy on 08/05/19 showed lupus {class IV+V with mild tubular atrophy, mild arterio sclerosis with no crescents). Was started on HD via RT IJ permacath and has since been on HD at [facility]. HAs remained anuric and deemed ESRD. On Monday this week presented to HD with RUE swelling, fever and chills. Sent to [facility] [place] [facility] a CTA was negative for PC thrombus and an US did not reveal any abcess. Signed out AMA on Tuesday as her daughter was ill.   On Wednesday presented to HD unit, found to have worsening RUE swelling sent to ED. Work-up revealed pancytopenia and elevated INR ( IN SETTING OF COUMADIN). CTA chest was negative for any thrombus. CT upper extremity to R/O abcess is pending. complements are low.  Renal consulted for ESRD.     # ESRD admitted with RUE swelling. No central venous thrombosis or stenosis. c/o tenderness of RT PC.   As with pancytopenia and elevated INR, cannot place a new access. HD via permacath.  seen by vascular surgery  cont ABX for cellulitis   CT RUE pending to r/o abcess.  re-evaluate  need for  HD tomorrow.  # Lupus flare, pancytopenia as well . coagulopathy on coumadin. pulse steroids. consult rheumatology.  holding cellcept.  on neupogen. Will give procrit on HD and a unit of PRBC Heme consulted.    Many thanks for the consult

## 2019-10-17 NOTE — CONSULT NOTE ADULT - SUBJECTIVE AND OBJECTIVE BOX
[facility] : Progress Note :: [phone], (office [phone]),   Dr Hollis / Dr Pineda / Dr Saunders / Dr Padilla / Dr Thomas MCMULLEN / Dr Silveira / Dr Lizama / Dr Migel matamoros  _____________________________________________________________________________________________  Patient is a 44y Female with SLE. Was admitted in [facility] in August with lupus flare, BRIAN ( baseline SCR 1.3). A renal biopsy on 08/05/19 showed lupus {class IV+V with mild tubular atrophy, mild arterio sclerosis with no crescents). Was started on HD via RT IJ permacath and has since been on HD at [facility]. HAs remained anuric and deemed ESRD. On Monday this week presented to HD with RUE swelling, fever and chills. Sent to [facility] a CTA was negative for PC thrombus and an US did not reveal any abcess. Signed out AMA on Tuesday as her daughter was ill.   On Wednesday presented to HD unit, found to have worsening RUE swelling sent to ED. Work-up revealed pancytopenia and elevated INR ( IN SETTING OF COUMADIN). CTA chest was negative for any thrombus. CT upper extremity to R/O abcess is pending. complements are low.  Renal consulted for ESRD.   c/o pain over RT permacath.     PAST MEDICAL & SURGICAL HISTORY:  HTN (hypertension)  ESRD (end stage renal disease) on dialysis  PE (pulmonary thromboembolism): Aug 2019  DM (diabetes mellitus)  SLE (systemic lupus erythematosus)  No significant past surgical history    No Known Allergies    Home Medications Reviewed  Hospital Medications:   MEDICATIONS  (STANDING):  atovaquone Suspension 1500 milliGRAM(s) Oral daily  chlorhexidine 2% Cloths 1 Application(s) Topical daily  epoetin renita Injectable 83664 Unit(s) IV Push <User Schedule>  ferrous    sulfate 325 milliGRAM(s) Oral daily  filgrastim-sndz Injectable 480 MICROGram(s) SubCutaneous daily  insulin lispro (HumaLOG) corrective regimen sliding scale   SubCutaneous Before meals and at bedtime  methylPREDNISolone sodium succinate IVPB 1000 milliGRAM(s) IV Intermittent daily  pantoprazole  Injectable 40 milliGRAM(s) IV Push daily  phytonadione   Solution 2.5 milliGRAM(s) Oral once  sevelamer carbonate 1600 milliGRAM(s) Oral three times a day  vancomycin  IVPB 500 milliGRAM(s) IV Intermittent <User Schedule>    SOCIAL HISTORY:  Denies ETOh,Smoking,   FAMILY HISTORY:  No pertinent family history in first degree relatives        VITALS:  T(F): 97 (10-17-19 @ 13:36), Max: 98.7 (10-17-19 @ 04:45)  HR: 86 (10-17-19 @ 13:36)  BP: 116/63 (10-17-19 @ 13:36)  RR: 16 (10-17-19 @ 13:36)  SpO2: 96% (10-17-19 @ 13:36)  Wt(kg): --      PHYSICAL EXAM:  Constitutional: NAD  HEENT: anicteric sclera, oropharynx clear.  Neck: No JVD  Respiratory: CTAB, no wheezes, rales or rhonchi  Cardiovascular: S1, S2, RRR  Gastrointestinal: BS+, soft, NT/ND  Extremities: RUE swollen+++. no RT breast swelling.   No lower extremity edema  Neurological: A/O x 3, no focal deficits  Skin: generalized petechae  Vascular Access: RT IJ permacath     LABS:  10-17    133<L>  |  99  |  54<H>  ----------------------------<  135<H>  3.8   |  25  |  7.64<H>    Ca    6.8<L>      17 Oct 2019 08:07  Phos  4.2     10-17  Mg     2.1     10-17    TPro  4.9<L>  /  Alb  1.2<L>  /  TBili  2.5<H>  /  DBili      /  AST  62<H>  /  ALT  114<H>  /  AlkPhos  122<H>  10-17    Creatinine Trend: 7.64 <--, 6.81 <--                        7.3    1.77  )-----------( 17       ( 17 Oct 2019 08:07 )             23.1     Urine Studies:      RADIOLOGY & ADDITIONAL STUDIES:

## 2019-10-17 NOTE — PROGRESS NOTE ADULT - PROBLEM SELECTOR PLAN 7
-gave Hydrocortisone 100 mg IV stat one dose:   -Hold Methyl prednisone 16 mg ( home dose of steroids), will start on stress dose of steroids: hydrocortef 50 q8    -c/w Atovaquone   -PO Protonix for GI proph   -not in flare, but will send ESR, CRP, C3., C4 -gave Hydrocortisone 100 mg IV stat one dose:   -Hold Methyl prednisone 16 mg ( home dose of steroids),   - on stress dose of steroids: hydrocortef 50 q8    -c/w Atovaquone   -PO Protonix for GI proph   -elevated ESR and CRP  C3 and C4 still testing.

## 2019-10-17 NOTE — PROGRESS NOTE ADULT - SUBJECTIVE AND OBJECTIVE BOX
Patient is a 44y old  Female who presents with a chief complaint of Right arm swelling, pain (17 Oct 2019 09:09)/pancytopenia      INTERVAL HPI/OVERNIGHT EVENTS:  T(C): 37.1 (10-17-19 @ 04:45), Max: 37.1 (10-17-19 @ 04:45)  HR: 88 (10-17-19 @ 04:45) (88 - 90)  BP: 117/65 (10-17-19 @ 04:45) (101/60 - 117/65)  RR: 18 (10-17-19 @ 04:45) (15 - 18)  SpO2: 96% (10-17-19 @ 04:45) (96% - 98%)  Wt(kg): --    LABS:                        7.3    1.77  )-----------( 17       ( 17 Oct 2019 08:07 )             23.1     10-17    133<L>  |  99  |  54<H>  ----------------------------<  135<H>  3.8   |  25  |  7.64<H>    Ca    6.8<L>      17 Oct 2019 08:07  Phos  4.2     10-17  Mg     2.1     10-17    TPro  4.9<L>  /  Alb  1.2<L>  /  TBili  2.5<H>  /  DBili  x   /  AST  62<H>  /  ALT  114<H>  /  AlkPhos  122<H>  10-17    PT/INR - ( 17 Oct 2019 08:07 )   PT: 68.7 sec;   INR: 5.86 ratio         PTT - ( 17 Oct 2019 08:07 )  PTT:72.4 sec    CAPILLARY BLOOD GLUCOSE      POCT Blood Glucose.: 139 mg/dL (17 Oct 2019 07:53)  POCT Blood Glucose.: 142 mg/dL (16 Oct 2019 22:44)        RADIOLOGY & ADDITIONAL TESTS:  < from: Xray Chest 1 View- PORTABLE-Urgent (10.16.19 @ 11:24) >    Heart magnified by AP film shallow inspiration. Streaky   fibrotic/atelectatic change right mid and lower lung field. No gross   focal infiltrate or pleural collection. Right hemodialysis catheter tip   in the right atrium.    Impression: As above    < end of copied text >    Consultant(s) Notes Reviewed:  [x ] YES  [ ] NO    PHYSICAL EXAM:  GENERAL: well built, well nourished  HEAD:  Atraumatic, Normocephalic  EYES: EOMI, PERRLA, conjunctiva and sclera clear  ENT: No tonsillar erythema, exudates, or enlargement; Moist mucous membranes, Good dentition, No lesions  NECK: Supple, No JVD, Normal thyroid, no enlarged nodes  NERVOUS SYSTEM:  Alert & Oriented X3, Good concentration; Motor Strength 5/5 B/L upper and lower extremities; DTRs 2+ intact and symmetric, sensory intact  CHEST/LUNG: B/L good air entry; No rales, rhonchi, or wheezing, right chest wall P.C in place no active bleeding  HEART: S1S2 normal, no S3, Regular rate and rhythm; No murmurs, rubs, or gallops  ABDOMEN: Soft, Nontender, Nondistended; Bowel sounds present  EXTREMITIES:  RUE swelling/warm on touch  LYMPH: No lymphadenopathy noted  SKIN: right arm area redness, left arm area discoloration    Care Discussed with Consultants/Other Providers [ x] YES  [ ] NO

## 2019-10-17 NOTE — PROGRESS NOTE ADULT - ASSESSMENT
44 yr old female , from home, H/O lupus/pancytopenia/ESRD on HD, recent sign AMA from other hospital for fever right arm swelling/pain/, CTA that time no evidence of emboli or abscess, send to ER BY renal for severe right arm swelling/warm/pain/pancytopenia, WBC in ER 1, plt 24 yesterday today 17, mild drop H/H 7.3 today, per renal able to use P.C for HD today will given one unit PRBC during HD, start steroid/neupogen, close monitor lab, ICU consult, if condition worsening, no active bleeding for now, blood culture/IV ABS, ID consult/Hemo consult, vascular consult, when more stable new P.C placement, one dose VIT K 2.5 mg po. Full code. If worse, ICU consult. If RUE swelling worse, surgery consult to rule out compartment syndrome, keep RUE elevation all time.

## 2019-10-18 LAB
ANION GAP SERPL CALC-SCNC: 10 MMOL/L — SIGNIFICANT CHANGE UP (ref 5–17)
BASOPHILS # BLD AUTO: 0.04 K/UL — SIGNIFICANT CHANGE UP (ref 0–0.2)
BASOPHILS NFR BLD AUTO: 1.2 % — SIGNIFICANT CHANGE UP (ref 0–2)
BUN SERPL-MCNC: 37 MG/DL — HIGH (ref 7–18)
CALCIUM SERPL-MCNC: 7.2 MG/DL — LOW (ref 8.4–10.5)
CHLORIDE SERPL-SCNC: 103 MMOL/L — SIGNIFICANT CHANGE UP (ref 96–108)
CO2 SERPL-SCNC: 26 MMOL/L — SIGNIFICANT CHANGE UP (ref 22–31)
CREAT SERPL-MCNC: 5.68 MG/DL — HIGH (ref 0.5–1.3)
EOSINOPHIL # BLD AUTO: 0 K/UL — SIGNIFICANT CHANGE UP (ref 0–0.5)
EOSINOPHIL NFR BLD AUTO: 0 % — SIGNIFICANT CHANGE UP (ref 0–6)
GLUCOSE BLDC GLUCOMTR-MCNC: 121 MG/DL — HIGH (ref 70–99)
GLUCOSE BLDC GLUCOMTR-MCNC: 160 MG/DL — HIGH (ref 70–99)
GLUCOSE SERPL-MCNC: 111 MG/DL — HIGH (ref 70–99)
HCT VFR BLD CALC: 27.5 % — LOW (ref 34.5–45)
HCT VFR BLD CALC: 27.5 % — LOW (ref 34.5–45)
HGB BLD-MCNC: 8.7 G/DL — LOW (ref 11.5–15.5)
HGB BLD-MCNC: 8.8 G/DL — LOW (ref 11.5–15.5)
IMM GRANULOCYTES NFR BLD AUTO: 19.3 % — HIGH (ref 0–1.5)
INR BLD: 2.28 RATIO — HIGH (ref 0.88–1.16)
INR BLD: 2.44 RATIO — HIGH (ref 0.88–1.16)
LYMPHOCYTES # BLD AUTO: 0.29 K/UL — LOW (ref 1–3.3)
LYMPHOCYTES # BLD AUTO: 8.9 % — LOW (ref 13–44)
MAGNESIUM SERPL-MCNC: 2.2 MG/DL — SIGNIFICANT CHANGE UP (ref 1.6–2.6)
MCHC RBC-ENTMCNC: 27.5 PG — SIGNIFICANT CHANGE UP (ref 27–34)
MCHC RBC-ENTMCNC: 27.8 PG — SIGNIFICANT CHANGE UP (ref 27–34)
MCHC RBC-ENTMCNC: 31.6 GM/DL — LOW (ref 32–36)
MCHC RBC-ENTMCNC: 32 GM/DL — SIGNIFICANT CHANGE UP (ref 32–36)
MCV RBC AUTO: 86.8 FL — SIGNIFICANT CHANGE UP (ref 80–100)
MCV RBC AUTO: 87 FL — SIGNIFICANT CHANGE UP (ref 80–100)
MONOCYTES # BLD AUTO: 0.25 K/UL — SIGNIFICANT CHANGE UP (ref 0–0.9)
MONOCYTES NFR BLD AUTO: 7.6 % — SIGNIFICANT CHANGE UP (ref 2–14)
NEUTROPHILS # BLD AUTO: 2.06 K/UL — SIGNIFICANT CHANGE UP (ref 1.8–7.4)
NEUTROPHILS NFR BLD AUTO: 63 % — SIGNIFICANT CHANGE UP (ref 43–77)
NRBC # BLD: 0 /100 WBCS — SIGNIFICANT CHANGE UP (ref 0–0)
NRBC # BLD: 0 /100 WBCS — SIGNIFICANT CHANGE UP (ref 0–0)
PHOSPHATE SERPL-MCNC: 3.7 MG/DL — SIGNIFICANT CHANGE UP (ref 2.5–4.5)
PLATELET # BLD AUTO: 17 K/UL — CRITICAL LOW (ref 150–400)
PLATELET # BLD AUTO: 20 K/UL — CRITICAL LOW (ref 150–400)
POTASSIUM SERPL-MCNC: 3.6 MMOL/L — SIGNIFICANT CHANGE UP (ref 3.5–5.3)
POTASSIUM SERPL-SCNC: 3.6 MMOL/L — SIGNIFICANT CHANGE UP (ref 3.5–5.3)
PROTHROM AB SERPL-ACNC: 26 SEC — HIGH (ref 10–12.9)
PROTHROM AB SERPL-ACNC: 27.8 SEC — HIGH (ref 10–12.9)
RBC # BLD: 3.16 M/UL — LOW (ref 3.8–5.2)
RBC # BLD: 3.17 M/UL — LOW (ref 3.8–5.2)
RBC # FLD: 18.2 % — HIGH (ref 10.3–14.5)
RBC # FLD: 18.3 % — HIGH (ref 10.3–14.5)
SODIUM SERPL-SCNC: 139 MMOL/L — SIGNIFICANT CHANGE UP (ref 135–145)
WBC # BLD: 2.57 K/UL — LOW (ref 3.8–10.5)
WBC # BLD: 3.27 K/UL — LOW (ref 3.8–10.5)
WBC # FLD AUTO: 2.57 K/UL — LOW (ref 3.8–10.5)
WBC # FLD AUTO: 3.27 K/UL — LOW (ref 3.8–10.5)

## 2019-10-18 PROCEDURE — 73201 CT UPPER EXTREMITY W/DYE: CPT | Mod: 26,RT

## 2019-10-18 RX ORDER — PSEUDOEPHEDRINE HCL 30 MG
30 TABLET ORAL ONCE
Refills: 0 | Status: COMPLETED | OUTPATIENT
Start: 2019-10-18 | End: 2019-10-18

## 2019-10-18 RX ORDER — IPRATROPIUM/ALBUTEROL SULFATE 18-103MCG
3 AEROSOL WITH ADAPTER (GRAM) INHALATION ONCE
Refills: 0 | Status: COMPLETED | OUTPATIENT
Start: 2019-10-18 | End: 2019-10-18

## 2019-10-18 RX ORDER — HEPARIN SODIUM 5000 [USP'U]/ML
INJECTION INTRAVENOUS; SUBCUTANEOUS
Qty: 25000 | Refills: 0 | Status: DISCONTINUED | OUTPATIENT
Start: 2019-10-18 | End: 2019-10-18

## 2019-10-18 RX ADMIN — ATOVAQUONE 1500 MILLIGRAM(S): 750 SUSPENSION ORAL at 12:11

## 2019-10-18 RX ADMIN — HEPARIN SODIUM 1400 UNIT(S)/HR: 5000 INJECTION INTRAVENOUS; SUBCUTANEOUS at 15:47

## 2019-10-18 RX ADMIN — Medication 258 MILLIGRAM(S): at 07:44

## 2019-10-18 RX ADMIN — PANTOPRAZOLE SODIUM 40 MILLIGRAM(S): 20 TABLET, DELAYED RELEASE ORAL at 12:11

## 2019-10-18 RX ADMIN — Medication 100 MILLIGRAM(S): at 14:33

## 2019-10-18 RX ADMIN — Medication 480 MICROGRAM(S): at 13:43

## 2019-10-18 RX ADMIN — Medication 30 MILLIGRAM(S): at 19:38

## 2019-10-18 RX ADMIN — Medication 100 MILLIGRAM(S): at 18:04

## 2019-10-18 RX ADMIN — SEVELAMER CARBONATE 1600 MILLIGRAM(S): 2400 POWDER, FOR SUSPENSION ORAL at 05:33

## 2019-10-18 RX ADMIN — SEVELAMER CARBONATE 1600 MILLIGRAM(S): 2400 POWDER, FOR SUSPENSION ORAL at 21:26

## 2019-10-18 RX ADMIN — Medication 325 MILLIGRAM(S): at 12:11

## 2019-10-18 RX ADMIN — CHLORHEXIDINE GLUCONATE 1 APPLICATION(S): 213 SOLUTION TOPICAL at 13:44

## 2019-10-18 RX ADMIN — Medication 1: at 12:10

## 2019-10-18 RX ADMIN — SEVELAMER CARBONATE 1600 MILLIGRAM(S): 2400 POWDER, FOR SUSPENSION ORAL at 13:45

## 2019-10-18 RX ADMIN — HEPARIN SODIUM 1400 UNIT(S)/HR: 5000 INJECTION INTRAVENOUS; SUBCUTANEOUS at 20:07

## 2019-10-18 RX ADMIN — Medication 3 MILLILITER(S): at 19:24

## 2019-10-18 NOTE — DIETITIAN INITIAL EVALUATION ADULT. - PROBLEM SELECTOR PLAN 1
-p/w RUE swelling and pain,   -BP: 90s/60s, afebrile WBC: 1K Lactate: 3.9  -ED course:  IV Unasyn x1   -Sepsis 2/2 cellulitis of RUE   -c/w Vanco after dialysis   - follow blood culture   -ID consult: Dr Pereyra consulted   - RUE elevation   -Patient had Doppler of RUE and CT angio chest from [place], reports in charts: both test were negative for any clots   -gave Hydrocortisone 100 mg IV stat one dose:   -Hold Methyl prednisone 16 mg ( home dose of steroids), will start on stress dose of steroids: hydrocortef 50 q8   -Primary team to send culture through Right Perma cath during HD tomorrow  -monitor for compartment syndrome

## 2019-10-18 NOTE — CHART NOTE - NSCHARTNOTEFT_GEN_A_CORE
Paged by RN for cough after heparin drip started.  Pt seen and examined at bedside  Unlikely related to heparin. No signs of allergic rxn - no rash, wheezing or crackles. Mild intermittent cough.   Pt reports she developed a cough with heparin at [facility] in early august when diagnosed with PE.    Recc. duoneb and sudafed with monitoring. Pt preferred to stop heparin but agreed to plan when risks of stopping AC explained    Primary team to follow

## 2019-10-18 NOTE — PROGRESS NOTE ADULT - ASSESSMENT
Patient is a 44y old  Female who presents with multiple co-morbidities including ESRD via Right ACW Perm-cath and now presents to the ER for evaluation  of Right arm swelling, pain (16 Oct 2019 16:23). On admission, she has no fever but Leukopenia. She has started on Vancomycin and the ID consult requested to assist with further evaluation and antibiotic management.     # RUE Cellulitis -in the setting of Perm-cath at right ACW    would recommend:    1. Continue Cefazolin 1 g daily and Continue Vancomycin   2. Monitor and keep level between 15 to 20   3. Need to removal of Perm-cath   4. Pain management as needed  5. Keep RUE  elevated    d/w patient and House staff    will follow the patient with you Patient is a 44y old  Female who presents with multiple co-morbidities including ESRD via Right ACW Perm-cath and now presents to the ER for evaluation  of Right arm swelling, pain (16 Oct 2019 16:23). On admission, she has no fever but Leukopenia. She has started on Vancomycin and the ID consult requested to assist with further evaluation and antibiotic management.     # RUE Cellulitis -in the setting of Perm-cath at right ACW    would recommend:    1.  Keep RUE  elevated  2. Continue Cefazolin 1 g daily and Continue Vancomycin   3. Monitor and keep level between 15 to 20   4. Need to removal of Perm-cath   5. Pain management as needed      d/w patient and Nursing  staff    will follow the patient with you

## 2019-10-18 NOTE — DIETITIAN INITIAL EVALUATION ADULT. - PROBLEM SELECTOR PLAN 3
Called to inquire if labs from 3/5/18 were done. No answer, left message to return call.  [phone]
Mother returned call. Informed mother that no specimen received by lab oli for outstanding lab tests. Mother not aware that blood work was not drawn. Mother will have labs done next week at [facility] lab oli. Lab req faxed to [facility] lab oli. Will call mother once we receive lab results.
Second attempt to contact mother. No answer, left message to return call.
INR: 4.5, on coumadin for PE since August   -Hold Coumadin tonight   - Monitor INR in AM

## 2019-10-18 NOTE — PROGRESS NOTE ADULT - ASSESSMENT
44 yr old female , from home, H/O lupus/pancytopenia/ESRD on HD/DM, recent sign AMA from other hospital for fever right arm swelling/pain/, CTA that time no evidence of emboli or abscess, send to ER by  renal for severe right arm swelling/warm/pain/pancytopenia, WBC in ER 1, plt 24 yesterday, mild drop H/H 7.3 today, per renal able to use P.C for HD today, S/P   one unit PRBC transfusion  during HD,VIT K 2.5 mg po one dose, start steroid pulse treatment for 3 days/neupogen, close monitor lab, ICU consult, if condition worsening, no active bleeding for now, blood culture/IV ABS, ID consult/Hemo consult, vascular consult, when more stable new P.C placement, one dose VIT K 2.5 mg po. Lab todat, WBC 3.7 , H/H stable after blood transfusion, RUE swelling improved, continue RUE elevation, vascular follow up, Rheumatology consult. CT RUE today

## 2019-10-18 NOTE — PROGRESS NOTE ADULT - PROBLEM SELECTOR PLAN 6
on coumadin for PE .  INR 2.28.  coumadin held for now. IMANI Bates on case.    restart coumadin once INR is therapeutic

## 2019-10-18 NOTE — DIETITIAN INITIAL EVALUATION ADULT. - SIGNS/SYMPTOMS
Nutrient intake < 25 % , Alb 1.2, cellulitis R Arm Nutrient intake < 25 % , Alb 1.2, cellulitis R Arm, Stage 2  PU @ L Buttocks

## 2019-10-18 NOTE — PROGRESS NOTE ADULT - SUBJECTIVE AND OBJECTIVE BOX
Patient is a 44y old  Female who presents with a chief complaint of Right arm swelling, pain (18 Oct 2019 09:06)/pancytopenia/leukopenia, start steroid paulse treatment/neupogen, WBC improved, S/P one unit PRBC transfusion, IV ABS, CTA no evidence of PE, INR improved      INTERVAL HPI/OVERNIGHT EVENTS:  T(C): 36.7 (10-18-19 @ 04:57), Max: 36.7 (10-18-19 @ 04:57)  HR: 98 (10-18-19 @ 04:57) (81 - 98)  BP: 132/75 (10-18-19 @ 04:57) (105/55 - 132/75)  RR: 17 (10-18-19 @ 04:57) (16 - 18)  SpO2: 100% (10-18-19 @ 04:57) (96% - 100%)  Wt(kg): --    LABS:                        8.8    3.27  )-----------( 20       ( 18 Oct 2019 07:15 )             27.5     10-18    139  |  103  |  37<H>  ----------------------------<  111<H>  3.6   |  26  |  5.68<H>    Ca    7.2<L>      18 Oct 2019 07:15  Phos  3.7     10-18  Mg     2.2     10-18    TPro  4.9<L>  /  Alb  1.2<L>  /  TBili  2.5<H>  /  DBili  x   /  AST  62<H>  /  ALT  114<H>  /  AlkPhos  122<H>  10-17    PT/INR - ( 18 Oct 2019 07:15 )   PT: 26.0 sec;   INR: 2.28 ratio         PTT - ( 17 Oct 2019 08:07 )  PTT:72.4 sec    CAPILLARY BLOOD GLUCOSE      POCT Blood Glucose.: 121 mg/dL (18 Oct 2019 06:51)  POCT Blood Glucose.: 108 mg/dL (17 Oct 2019 21:34)  POCT Blood Glucose.: 117 mg/dL (17 Oct 2019 16:51)  POCT Blood Glucose.: 144 mg/dL (17 Oct 2019 11:44)        RADIOLOGY & ADDITIONAL TESTS:  < from: CT Angio Chest w/ IV Cont (10.17.19 @ 10:10) >  IMPRESSION:    No pulmonary embolism.    Patchy airspace disease in the right upper lobe which likely represents   pneumonia in the appropriate clinical setting.      < end of copied text >    Consultant(s) Notes Reviewed:  [x ] YES  [ ] NO    PHYSICAL EXAM:  GENERAL: well built, well nourished  HEAD:  Atraumatic, Normocephalic  EYES: EOMI, PERRLA, conjunctiva and sclera clear  ENT: No tonsillar erythema, exudates, or enlargement; Moist mucous membranes, Good dentition, No lesions  NECK: Supple, No JVD, Normal thyroid, no enlarged nodes  NERVOUS SYSTEM:  Alert & Oriented X3, Good concentration; Motor Strength 5/5 B/L upper and lower extremities; DTRs 2+ intact and symmetric, sensory intact  CHEST/LUNG: B/L good air entry; No rales, rhonchi, or wheezing, right cheat P.C in place   HEART: S1S2 normal, no S3, Regular rate and rhythm; No murmurs, rubs, or gallops  ABDOMEN: Soft, Nontender, Nondistended; Bowel sounds present  EXTREMITIES:  RUE swelling improved, with close large blister, warm on touch improved  LYMPH: No lymphadenopathy noted  SKIN: as above    Care Discussed with Consultants/Other Providers [ x] YES  [ ] NO

## 2019-10-18 NOTE — DIETITIAN INITIAL EVALUATION ADULT. - PROBLEM SELECTOR PLAN 2
-p/w WBC: 1K, RBC: 2.9K, Platelet: 24K  Pancytopenia could be SLE vs sepsis   - gave Hydrocortisone 100 mg IV stat one dose:   -Hold Methyl prednisone 16 mg ( home dose of steroids), will start on stress dose of steroids: hydrocortef 50 q8   - Dr Bates consulted, consider giving filgrastin as WBC count is 1K in setting of sepsis and immunocompromised state in setting of chronic steroid use   -Platelet count: 24K, monitor for bleeding for now   -Dr Bates consulted

## 2019-10-18 NOTE — PROGRESS NOTE ADULT - PROBLEM SELECTOR PLAN 7
-gave Hydrocortisone 100 mg IV stat one dose:   -Hold Methyl prednisone 16 mg ( home dose of steroids),   - on stress dose of steroids: hydrocortef 50 q8    -c/w Atovaquone   -PO Protonix for GI proph   -elevated ESR and CRP  C3 and C4 decreased

## 2019-10-18 NOTE — PROGRESS NOTE ADULT - SUBJECTIVE AND OBJECTIVE BOX
PGY 1 Note discussed with supervising resident and primary attending    Patient is a 44y old  Female who presents with a chief complaint of Right arm swelling, pain (17 Oct 2019 19:46)      INTERVAL HPI/OVERNIGHT EVENTS: Patient was seen and examined at bed side, Bullae noticed on Right forearm, Cefazolin was started yesterday .   MEDICATIONS  (STANDING):  atovaquone Suspension 1500 milliGRAM(s) Oral daily  ceFAZolin   IVPB      ceFAZolin   IVPB 1000 milliGRAM(s) IV Intermittent every 24 hours  chlorhexidine 2% Cloths 1 Application(s) Topical daily  epoetin renita Injectable 93214 Unit(s) IV Push <User Schedule>  ferrous    sulfate 325 milliGRAM(s) Oral daily  filgrastim-sndz Injectable 480 MICROGram(s) SubCutaneous daily  insulin lispro (HumaLOG) corrective regimen sliding scale   SubCutaneous Before meals and at bedtime  methylPREDNISolone sodium succinate IVPB 1000 milliGRAM(s) IV Intermittent daily  pantoprazole  Injectable 40 milliGRAM(s) IV Push daily  sevelamer carbonate 1600 milliGRAM(s) Oral three times a day  vancomycin  IVPB 500 milliGRAM(s) IV Intermittent <User Schedule>    MEDICATIONS  (PRN):  acetaminophen   Tablet .. 650 milliGRAM(s) Oral every 6 hours PRN Temp greater or equal to 38C (100.4F), Mild Pain (1 - 3)  traMADol 25 milliGRAM(s) Oral every 12 hours PRN Severe Pain (7 - 10)      __________________________________________________  REVIEW OF SYSTEMS:    CONSTITUTIONAL: No fever,   EYES: no acute visual disturbances  NECK: No pain or stiffness  RESPIRATORY: No cough; No shortness of breath  CARDIOVASCULAR: No chest pain, no palpitations  GASTROINTESTINAL: No pain. No nausea or vomiting; No diarrhea   NEUROLOGICAL: No headache or numbness, no tremors  MUSCULOSKELETAL:Pain in R arm.  GENITOURINARY: no dysuria, no frequency, no hesitancy  PSYCHIATRY: no depression , no anxiety  ALL OTHER  ROS negative        Vital Signs Last 24 Hrs  T(C): 36.7 (18 Oct 2019 04:57), Max: 36.7 (18 Oct 2019 04:57)  T(F): 98 (18 Oct 2019 04:57), Max: 98 (18 Oct 2019 04:57)  HR: 98 (18 Oct 2019 04:57) (81 - 98)  BP: 132/75 (18 Oct 2019 04:57) (105/55 - 132/75)  BP(mean): --  RR: 17 (18 Oct 2019 04:57) (16 - 18)  SpO2: 100% (18 Oct 2019 04:57) (96% - 100%)    ________________________________________________  PHYSICAL EXAM:  GENERAL: Obese lady .  HEENT: Normocephalic;  conjunctivae and sclerae clear; moist mucous membranes;   NECK : supple  CHEST/LUNG: Clear to auscultation bilaterally with good air entry   HEART: S1 S2  regular; no murmurs, gallops or rubs  ABDOMEN: Soft, Nontender, Nondistended; Bowel sounds present  EXTREMITIES: RUE swollen and tender, large blister noted on R arm.      NERVOUS SYSTEM:  Awake and alert; Oriented  to place, person and time ; no new deficits    _________________________________________________  LABS:                        8.8    3.27  )-----------( 20       ( 18 Oct 2019 07:15 )             27.5     10-18    139  |  103  |  37<H>  ----------------------------<  111<H>  3.6   |  26  |  5.68<H>    Ca    7.2<L>      18 Oct 2019 07:15  Phos  3.7     10-18  Mg     2.2     10-18    TPro  4.9<L>  /  Alb  1.2<L>  /  TBili  2.5<H>  /  DBili  x   /  AST  62<H>  /  ALT  114<H>  /  AlkPhos  122<H>  10-17    PT/INR - ( 18 Oct 2019 07:15 )   PT: 26.0 sec;   INR: 2.28 ratio         PTT - ( 17 Oct 2019 08:07 )  PTT:72.4 sec    CAPILLARY BLOOD GLUCOSE      POCT Blood Glucose.: 121 mg/dL (18 Oct 2019 06:51)  POCT Blood Glucose.: 108 mg/dL (17 Oct 2019 21:34)  POCT Blood Glucose.: 117 mg/dL (17 Oct 2019 16:51)  POCT Blood Glucose.: 144 mg/dL (17 Oct 2019 11:44)        RADIOLOGY & ADDITIONAL TESTS:    Imaging Personally Reviewed:  YES/NO    Consultant(s) Notes Reviewed:   YES/ No    Care Discussed with Consultants :     Plan of care was discussed with patient and /or primary care giver; all questions and concerns were addressed and care was aligned with patient's wishes.

## 2019-10-18 NOTE — DIETITIAN INITIAL EVALUATION ADULT. - ADD RECOMMEND
Provide soft foods, Nepro BID, May consider D/C Renal diet since Appetite is Poor and K and PHos WNL Provide soft foods, Nepro BID, May consider D/C Renal diet since Appetite is Poor and K and PHos WNL, Nephrocaps, Appetite stimulant.

## 2019-10-18 NOTE — DIETITIAN INITIAL EVALUATION ADULT. - OTHER INFO
Pt visited. Observed Lunch meal Pt w Poo PO intake. Pt reports Po  intake < 25 % and sometimes nothing. On Dialysis since Aug of this year. H/O DM x 3 yrs ( IDDM) . Pt w some chewing difficulty  D/U Catheter hurts. Offered soft foods agreed to try . Food choices Obtained, Pt visited. Observed Lunch meal Pt w Poo PO intake. Pt reports Po  intake < 25 % and sometimes nothing. On Dialysis since Aug of this year. H/O DM x 3 yrs ( IDDM) . Pt w some chewing difficulty  D/U Catheter hurts. Offered soft foods agreed to try . Food choices Obtained, Pt w stage 2 @ L buttocks

## 2019-10-18 NOTE — CHART NOTE - NSCHARTNOTEFT_GEN_A_CORE
Pt refused heparin drip. She was made clear of the risks if she does not take it. She still refused and wants to talk to dr Hollis in the morning

## 2019-10-18 NOTE — PROGRESS NOTE ADULT - SUBJECTIVE AND OBJECTIVE BOX
Patient is a 44y Female with  ESRD  ON HD   SEEN IN  IR SUITE,  IS  THERE   FOR  CT  ANGIO  R  UPPER  EXT  ACC TO PT   HER  PAIN R  ARM IS  BETTER,  THOUGH  STILL  THERE  FEELS  LESS  SWOLLEN     No Known Allergies    Hospital Medications:   MEDICATIONS  (STANDING):  atovaquone Suspension 1500 milliGRAM(s) Oral daily  ceFAZolin   IVPB      ceFAZolin   IVPB 1000 milliGRAM(s) IV Intermittent every 24 hours  chlorhexidine 2% Cloths 1 Application(s) Topical daily  epoetin renita Injectable 64767 Unit(s) IV Push <User Schedule>  ferrous    sulfate 325 milliGRAM(s) Oral daily  filgrastim-sndz Injectable 480 MICROGram(s) SubCutaneous daily  insulin lispro (HumaLOG) corrective regimen sliding scale   SubCutaneous Before meals and at bedtime  methylPREDNISolone sodium succinate IVPB 1000 milliGRAM(s) IV Intermittent daily  pantoprazole  Injectable 40 milliGRAM(s) IV Push daily  sevelamer carbonate 1600 milliGRAM(s) Oral three times a day  vancomycin  IVPB 500 milliGRAM(s) IV Intermittent <User Schedule>    REVIEW OF SYSTEMS:  HAS  NO  FEVER/CHILLS  NO COUGH  OR  SOB   APPETITE IS  NOT  GOOD,  THOUGH DID  HAVE  SOME  BREAKFAST   SWELLING  AND  PAIN  R  UPPER  ARM  BETTER  HAS FLUID  FILLED BLISTER 3 X 3  CMS R  FOREARM  ABLE TO EXTEND  HER  FOREARM  A  LITTLE    VITALS:  T(F): 98 (10-18-19 @ 04:57), Max: 98 (10-18-19 @ 04:57)  HR: 98 (10-18-19 @ 04:57)  BP: 132/75 (10-18-19 @ 04:57)  RR: 17 (10-18-19 @ 04:57)  SpO2: 100% (10-18-19 @ 04:57)  Wt(kg): --      PHYSICAL EXAM:  Constitutional: NAD  HEENT: CONJ  PALE  Neck: No JVD,  R  IJ  PC  IN PLACE  Respiratory: CTAB, no wheezes, rales or rhonchi  Cardiovascular: S1, S2, RRR  Gastrointestinal: BS+, soft, NT/ND  Extremities:  peripheral edema TRACE  Neurological: A/O x 3,   : . No lazcano.   Skin: HAS  PETECHIAL  RASH  OVER  UPPER  EXT  Vascular Access:R IJ  PC  R UPPER  EXT  HAS  SWELLING,  REDNESS   OVER  UPPER  ARM,  SWELLING IS  ALSO  THERE, PAINFUL  EXTENSION    LABS:  10-18    139  |  103  |  37<H>  ----------------------------<  111<H>  3.6   |  26  |  5.68<H>    Ca    7.2<L>      18 Oct 2019 07:15  Phos  3.7     10-18  Mg     2.2     10-18    TPro  4.9<L>  /  Alb  1.2<L>  /  TBili  2.5<H>  /  DBili      /  AST  62<H>  /  ALT  114<H>  /  AlkPhos  122<H>  10-17    Creatinine Trend: 5.68 <--, 7.64 <--, 6.81 <--                        8.8    3.27  )-----------( 20       ( 18 Oct 2019 07:15 )             27.5     Urine Studies:      RADIOLOGY & ADDITIONAL STUDIES:

## 2019-10-18 NOTE — DIETITIAN INITIAL EVALUATION ADULT. - PROBLEM SELECTOR PLAN 7
-gave Hydrocortisone 100 mg IV stat one dose:   -Hold Methyl prednisone 16 mg ( home dose of steroids), will start on stress dose of steroids: hydrocortef 50 q8    -c/w Atovaquone   -PO Protonix for GI proph   -not in flare, but will send ESR, CRP, C3., C4

## 2019-10-18 NOTE — PROGRESS NOTE ADULT - SUBJECTIVE AND OBJECTIVE BOX
feel better  no fever  pain at right arm is less  swelling is less tense    MEDICATIONS  (STANDING):  atovaquone Suspension 1500 milliGRAM(s) Oral daily  ceFAZolin   IVPB      ceFAZolin   IVPB 1000 milliGRAM(s) IV Intermittent every 24 hours  chlorhexidine 2% Cloths 1 Application(s) Topical daily  epoetin renita Injectable 21432 Unit(s) IV Push <User Schedule>  ferrous    sulfate 325 milliGRAM(s) Oral daily  filgrastim-sndz Injectable 480 MICROGram(s) SubCutaneous daily  insulin lispro (HumaLOG) corrective regimen sliding scale   SubCutaneous Before meals and at bedtime  methylPREDNISolone sodium succinate IVPB 1000 milliGRAM(s) IV Intermittent daily  pantoprazole  Injectable 40 milliGRAM(s) IV Push daily  sevelamer carbonate 1600 milliGRAM(s) Oral three times a day  vancomycin  IVPB 500 milliGRAM(s) IV Intermittent <User Schedule>    MEDICATIONS  (PRN):  acetaminophen   Tablet .. 650 milliGRAM(s) Oral every 6 hours PRN Temp greater or equal to 38C (100.4F), Mild Pain (1 - 3)  traMADol 25 milliGRAM(s) Oral every 12 hours PRN Severe Pain (7 - 10)      Allergies    No Known Allergies    Intolerances        Vital Signs Last 24 Hrs  T(C): 36.7 (18 Oct 2019 04:57), Max: 36.7 (18 Oct 2019 04:57)  T(F): 98 (18 Oct 2019 04:57), Max: 98 (18 Oct 2019 04:57)  HR: 98 (18 Oct 2019 04:57) (81 - 98)  BP: 132/75 (18 Oct 2019 04:57) (105/55 - 132/75)  BP(mean): --  RR: 17 (18 Oct 2019 04:57) (16 - 18)  SpO2: 100% (18 Oct 2019 04:57) (96% - 100%)    PHYSICAL EXAM  General: adult in NAD  HEENT: clear oropharynx, anicteric sclera, pink conjunctiva  Neck: supple  CV: normal S1/S2 with no murmur rubs or gallops  Lungs: positive air movement b/l ant lungs,clear to auscultation, no wheezes, no rales  Abdomen: soft non-tender non-distended, no hepatosplenomegaly  Ext: no clubbing cyanosis or edema  Skin: no rashes and no petechiae  Neuro: alert and oriented X 4, no focal deficits  LABS:                          8.8    3.27  )-----------( 20       ( 18 Oct 2019 07:15 )             27.5         Mean Cell Volume : 86.8 fl  Mean Cell Hemoglobin : 27.8 pg  Mean Cell Hemoglobin Concentration : 32.0 gm/dL  Auto Neutrophil # : 2.06 K/uL  Auto Lymphocyte # : 0.29 K/uL  Auto Monocyte # : 0.25 K/uL  Auto Eosinophil # : 0.00 K/uL  Auto Basophil # : 0.04 K/uL  Auto Neutrophil % : 63.0 %  Auto Lymphocyte % : 8.9 %  Auto Monocyte % : 7.6 %  Auto Eosinophil % : 0.0 %  Auto Basophil % : 1.2 %    Serial CBC  Hematocrit 27.5  Hemoglobin 8.8  Plat 20  RBC 3.17  WBC 3.27  Serial CBC  Hematocrit 27.5  Hemoglobin 8.7  Plat 17  RBC 3.16  WBC 2.57  Serial CBC  Hematocrit 24.5  Hemoglobin 7.7  Plat 22  RBC 2.83  WBC 2.99  Serial CBC  Hematocrit 23.1  Hemoglobin 7.3  Plat 17  RBC 2.67  WBC 1.77  Serial CBC  Hematocrit 26.2  Hemoglobin 8.1  Plat 24  RBC 2.99  WBC 1.02    10-18    139  |  103  |  37<H>  ----------------------------<  111<H>  3.6   |  26  |  5.68<H>    Ca    7.2<L>      18 Oct 2019 07:15  Phos  3.7     10-18  Mg     2.2     10-18    TPro  4.9<L>  /  Alb  1.2<L>  /  TBili  2.5<H>  /  DBili  x   /  AST  62<H>  /  ALT  114<H>  /  AlkPhos  122<H>  10-17      PT/INR - ( 18 Oct 2019 07:15 )   PT: 26.0 sec;   INR: 2.28 ratio         PTT - ( 17 Oct 2019 08:07 )  PTT:72.4 sec    Vitamin B12, Serum: >2000 pg/mL (10-17 @ 10:14)  Folate, Serum: 4.7 ng/mL (10-17 @ 10:14)            BLOOD SMEAR INTERPRETATION:       RADIOLOGY & ADDITIONAL STUDIES:

## 2019-10-18 NOTE — PROGRESS NOTE ADULT - ASSESSMENT
A/-P  ESRD ON  HD,  WAS  DIALYZED  YESTERDAY  FOR  REGULAR  HD  ON  SAT  HER  CATH  WAS NOT  WORKING  WELL    D/W  VASCULAR  DR INGRAM,   ACC TO  HIM,  SHOULD  TRY USING TPA  BEFORE  HD  TOMORROW, AS  PT  HAS  V  LIMITED OPTION IN  VIEW  OF HER LOW  PLT  COUNT  CHANGING  PC  WILL  BE  AN  ISSUE  DOESNT  APPEAR IN  VOL  OVERLOAD  ALSO  SPOKE  WITH  RADIOLOGY  ATTENDING  TO  TRY  TO  INCLUDE  ARTERIAL PHASE AS  WELL  IN  THE  STUDY,  HE  WILL TRY    S/P 1  U PRBC   IS BEING  FOLLOWED  BY  HEMATOLOGY - LOW PLT  COUNT, OFF  CELL CEPT   ON  STEROIDS  ON  ABX  AS PER  ID    WILL FOLLOW

## 2019-10-18 NOTE — DIETITIAN INITIAL EVALUATION ADULT. - PROBLEM SELECTOR PLAN 8
124 -home med: Amlodipine, Coreg   Held in setting of sepsis and hypotension   Monitor BP   restart as needed

## 2019-10-18 NOTE — PROGRESS NOTE ADULT - SUBJECTIVE AND OBJECTIVE BOX
Patient is seen and examined at the bed side, is afebrile. She is feeling same, now forming a blister at right hand, stay swollen and erythematous some parts of it. The blood cultures have no growth to date.         REVIEW OF SYSTEMS: All other review systems are negative      ALLERGIES:  No Known Allergies      Vital Signs Last 24 Hrs  T(C): 36.6 (18 Oct 2019 13:58), Max: 36.7 (18 Oct 2019 04:57)  T(F): 97.9 (18 Oct 2019 13:58), Max: 98 (18 Oct 2019 04:57)  HR: 90 (18 Oct 2019 13:58) (81 - 98)  BP: 120/63 (18 Oct 2019 13:58) (105/55 - 132/75)  BP(mean): --  RR: 18 (18 Oct 2019 13:58) (16 - 18)  SpO2: 97% (18 Oct 2019 13:58) (97% - 100%)      PHYSICAL EXAM:  GENERAL: Not in distress   CHEST/LUNG:  Air entry bilaterally, RIght  ACW Perm-cath in placed  HEART: s1 and s2 present  ABDOMEN:  Nontender and  Nondistended  EXTREMITIES: RUE swollen, mild erythematous and very tender  CNS: Awake and Alert        LABS:                        8.8    3.27  )-----------( 20       ( 18 Oct 2019 07:15 )             27.5                           7.3    1.77  )-----------( 17       ( 17 Oct 2019 08:07 )             23.1         10-18    139  |  103  |  37<H>  ----------------------------<  111<H>  3.6   |  26  |  5.68<H>    Ca    7.2<L>      18 Oct 2019 07:15  Phos  3.7     10-18  Mg     2.2     10-18    TPro  4.9<L>  /  Alb  1.2<L>  /  TBili  2.5<H>  /  DBili  x   /  AST  62<H>  /  ALT  114<H>  /  AlkPhos  122<H>  10-17    10-17    133<L>  |  99  |  54<H>  ----------------------------<  135<H>  3.8   |  25  |  7.64<H>    Ca    6.8<L>      17 Oct 2019 08:07  Phos  4.2     10-17  Mg     2.1     10-17    TPro  4.9<L>  /  Alb  1.2<L>  /  TBili  2.5<H>  /  DBili  x   /  AST  62<H>  /  ALT  114<H>  /  AlkPhos  122<H>  10-17          MEDICATIONS  (STANDING):  atovaquone Suspension 1500 milliGRAM(s) Oral daily  ceFAZolin   IVPB      ceFAZolin   IVPB 1000 milliGRAM(s) IV Intermittent every 24 hours  chlorhexidine 2% Cloths 1 Application(s) Topical daily  epoetin renita Injectable 47168 Unit(s) IV Push <User Schedule>  ferrous    sulfate 325 milliGRAM(s) Oral daily  filgrastim-sndz Injectable 480 MICROGram(s) SubCutaneous daily  heparin  Infusion.  Unit(s)/Hr (14 mL/Hr) IV Continuous <Continuous>  insulin lispro (HumaLOG) corrective regimen sliding scale   SubCutaneous Before meals and at bedtime  methylPREDNISolone sodium succinate IVPB 1000 milliGRAM(s) IV Intermittent daily  pantoprazole  Injectable 40 milliGRAM(s) IV Push daily  sevelamer carbonate 1600 milliGRAM(s) Oral three times a day  vancomycin  IVPB 500 milliGRAM(s) IV Intermittent <User Schedule>    MEDICATIONS  (PRN):  acetaminophen   Tablet .. 650 milliGRAM(s) Oral every 6 hours PRN Temp greater or equal to 38C (100.4F), Mild Pain (1 - 3)  traMADol 25 milliGRAM(s) Oral every 12 hours PRN Severe Pain (7 - 10)          RADIOLOGY & ADDITIONAL TESTS:    10/18/19 : CT Upper Extremity w/ IV Cont, Right (10.18.19 @ 13:24) No evidence of any major arterial or venous occlusion or a soft tissue   collection. Abnormal findings involving the subcutaneous soft tissues and skin may be related to cellulitis. Correlate clinically.    10/16/19 : Xray Chest 1 View- PORTABLE-Urgent (10.16.19 @ 11:24) Heart magnified by AP film shallow inspiration. Streaky   fibrotic/atelectatic change right mid and lower lung field. No gross focal infiltrate or pleural collection. Right hemodialysis catheter tip in the right atrium.          MICROBIOLOGY DATA:    Culture - Blood (10.17.19 @ 13:59)    Specimen Source: .Blood    Culture Results:   No growth to date.        Culture - Blood (10.16.19 @ 19:00)    Specimen Source: .Blood    Culture Results:   No growth to date.      Culture - Blood (10.16.19 @ 19:00)    Specimen Source: .Blood    Culture Results:   No growth to date. Patient is seen and examined at the bed side, is afebrile. The RUE swelling improved a little.   The blood cultures remains negative. The Leukopenia is resolving.          REVIEW OF SYSTEMS: All other review systems are negative      ALLERGIES:  No Known Allergies      Vital Signs Last 24 Hrs  T(C): 36.6 (18 Oct 2019 13:58), Max: 36.7 (18 Oct 2019 04:57)  T(F): 97.9 (18 Oct 2019 13:58), Max: 98 (18 Oct 2019 04:57)  HR: 90 (18 Oct 2019 13:58) (81 - 98)  BP: 120/63 (18 Oct 2019 13:58) (105/55 - 132/75)  BP(mean): --  RR: 18 (18 Oct 2019 13:58) (16 - 18)  SpO2: 97% (18 Oct 2019 13:58) (97% - 100%)      PHYSICAL EXAM:  GENERAL: Not in distress   CHEST/LUNG:  Air entry bilaterally, RIght  ACW Perm-cath in placed  HEART: s1 and s2 present  ABDOMEN:  Nontender and  Nondistended  EXTREMITIES: RUE swollen, mild erythematous and very tender  CNS: Awake and Alert        LABS:                        8.8    3.27  )-----------( 20       ( 18 Oct 2019 07:15 )             27.5                           7.3    1.77  )-----------( 17       ( 17 Oct 2019 08:07 )             23.1         10-18    139  |  103  |  37<H>  ----------------------------<  111<H>  3.6   |  26  |  5.68<H>    Ca    7.2<L>      18 Oct 2019 07:15  Phos  3.7     10-18  Mg     2.2     10-18    TPro  4.9<L>  /  Alb  1.2<L>  /  TBili  2.5<H>  /  DBili  x   /  AST  62<H>  /  ALT  114<H>  /  AlkPhos  122<H>  10-17    10-17    133<L>  |  99  |  54<H>  ----------------------------<  135<H>  3.8   |  25  |  7.64<H>    Ca    6.8<L>      17 Oct 2019 08:07  Phos  4.2     10-17  Mg     2.1     10-17    TPro  4.9<L>  /  Alb  1.2<L>  /  TBili  2.5<H>  /  DBili  x   /  AST  62<H>  /  ALT  114<H>  /  AlkPhos  122<H>  10-17          MEDICATIONS  (STANDING):  atovaquone Suspension 1500 milliGRAM(s) Oral daily  ceFAZolin   IVPB      ceFAZolin   IVPB 1000 milliGRAM(s) IV Intermittent every 24 hours  chlorhexidine 2% Cloths 1 Application(s) Topical daily  epoetin renita Injectable 34269 Unit(s) IV Push <User Schedule>  ferrous    sulfate 325 milliGRAM(s) Oral daily  filgrastim-sndz Injectable 480 MICROGram(s) SubCutaneous daily  heparin  Infusion.  Unit(s)/Hr (14 mL/Hr) IV Continuous <Continuous>  insulin lispro (HumaLOG) corrective regimen sliding scale   SubCutaneous Before meals and at bedtime  methylPREDNISolone sodium succinate IVPB 1000 milliGRAM(s) IV Intermittent daily  pantoprazole  Injectable 40 milliGRAM(s) IV Push daily  sevelamer carbonate 1600 milliGRAM(s) Oral three times a day  vancomycin  IVPB 500 milliGRAM(s) IV Intermittent <User Schedule>    MEDICATIONS  (PRN):  acetaminophen   Tablet .. 650 milliGRAM(s) Oral every 6 hours PRN Temp greater or equal to 38C (100.4F), Mild Pain (1 - 3)  traMADol 25 milliGRAM(s) Oral every 12 hours PRN Severe Pain (7 - 10)          RADIOLOGY & ADDITIONAL TESTS:    10/18/19 : CT Upper Extremity w/ IV Cont, Right (10.18.19 @ 13:24) No evidence of any major arterial or venous occlusion or a soft tissue   collection. Abnormal findings involving the subcutaneous soft tissues and skin may be related to cellulitis. Correlate clinically.    10/16/19 : Xray Chest 1 View- PORTABLE-Urgent (10.16.19 @ 11:24) Heart magnified by AP film shallow inspiration. Streaky   fibrotic/atelectatic change right mid and lower lung field. No gross focal infiltrate or pleural collection. Right hemodialysis catheter tip in the right atrium.          MICROBIOLOGY DATA:    Culture - Blood (10.17.19 @ 13:59)    Specimen Source: .Blood    Culture Results:   No growth to date.        Culture - Blood (10.16.19 @ 19:00)    Specimen Source: .Blood    Culture Results:   No growth to date.      Culture - Blood (10.16.19 @ 19:00)    Specimen Source: .Blood    Culture Results:   No growth to date.

## 2019-10-19 LAB
ANION GAP SERPL CALC-SCNC: 14 MMOL/L — SIGNIFICANT CHANGE UP (ref 5–17)
BASOPHILS # BLD AUTO: 0 K/UL — SIGNIFICANT CHANGE UP (ref 0–0.2)
BASOPHILS NFR BLD AUTO: 0 % — SIGNIFICANT CHANGE UP (ref 0–2)
BUN SERPL-MCNC: 45 MG/DL — HIGH (ref 7–18)
CALCIUM SERPL-MCNC: 7.4 MG/DL — LOW (ref 8.4–10.5)
CHLORIDE SERPL-SCNC: 98 MMOL/L — SIGNIFICANT CHANGE UP (ref 96–108)
CO2 SERPL-SCNC: 21 MMOL/L — LOW (ref 22–31)
CREAT SERPL-MCNC: 6.44 MG/DL — HIGH (ref 0.5–1.3)
DSDNA AB SER-ACNC: 97 IU/ML — HIGH
EOSINOPHIL # BLD AUTO: 0 K/UL — SIGNIFICANT CHANGE UP (ref 0–0.5)
EOSINOPHIL NFR BLD AUTO: 0 % — SIGNIFICANT CHANGE UP (ref 0–6)
GLUCOSE BLDC GLUCOMTR-MCNC: 153 MG/DL — HIGH (ref 70–99)
GLUCOSE SERPL-MCNC: 254 MG/DL — HIGH (ref 70–99)
HCT VFR BLD CALC: 27.4 % — LOW (ref 34.5–45)
HGB BLD-MCNC: 8.3 G/DL — LOW (ref 11.5–15.5)
INR BLD: 1.94 RATIO — HIGH (ref 0.88–1.16)
LYMPHOCYTES # BLD AUTO: 0.15 K/UL — LOW (ref 1–3.3)
LYMPHOCYTES # BLD AUTO: 2 % — LOW (ref 13–44)
MAGNESIUM SERPL-MCNC: 2.2 MG/DL — SIGNIFICANT CHANGE UP (ref 1.6–2.6)
MCHC RBC-ENTMCNC: 27.1 PG — SIGNIFICANT CHANGE UP (ref 27–34)
MCHC RBC-ENTMCNC: 30.3 GM/DL — LOW (ref 32–36)
MCV RBC AUTO: 89.5 FL — SIGNIFICANT CHANGE UP (ref 80–100)
MONOCYTES # BLD AUTO: 0.84 K/UL — SIGNIFICANT CHANGE UP (ref 0–0.9)
MONOCYTES NFR BLD AUTO: 11 % — SIGNIFICANT CHANGE UP (ref 2–14)
NEUTROPHILS # BLD AUTO: 6.66 K/UL — SIGNIFICANT CHANGE UP (ref 1.8–7.4)
NEUTROPHILS NFR BLD AUTO: 83 % — HIGH (ref 43–77)
PHOSPHATE SERPL-MCNC: 4.6 MG/DL — HIGH (ref 2.5–4.5)
PLATELET # BLD AUTO: 26 K/UL — LOW (ref 150–400)
POTASSIUM SERPL-MCNC: 4.2 MMOL/L — SIGNIFICANT CHANGE UP (ref 3.5–5.3)
POTASSIUM SERPL-SCNC: 4.2 MMOL/L — SIGNIFICANT CHANGE UP (ref 3.5–5.3)
PROTHROM AB SERPL-ACNC: 22 SEC — HIGH (ref 10–12.9)
RBC # BLD: 3.06 M/UL — LOW (ref 3.8–5.2)
RBC # FLD: 18.9 % — HIGH (ref 10.3–14.5)
SODIUM SERPL-SCNC: 133 MMOL/L — LOW (ref 135–145)
WBC # BLD: 7.66 K/UL — SIGNIFICANT CHANGE UP (ref 3.8–10.5)
WBC # FLD AUTO: 7.66 K/UL — SIGNIFICANT CHANGE UP (ref 3.8–10.5)

## 2019-10-19 RX ORDER — HEPARIN SODIUM 5000 [USP'U]/ML
5000 INJECTION INTRAVENOUS; SUBCUTANEOUS EVERY 8 HOURS
Refills: 0 | Status: COMPLETED | OUTPATIENT
Start: 2019-10-19 | End: 2019-10-20

## 2019-10-19 RX ORDER — WARFARIN SODIUM 2.5 MG/1
3 TABLET ORAL ONCE
Refills: 0 | Status: COMPLETED | OUTPATIENT
Start: 2019-10-19 | End: 2019-10-19

## 2019-10-19 RX ADMIN — SEVELAMER CARBONATE 1600 MILLIGRAM(S): 2400 POWDER, FOR SUSPENSION ORAL at 05:56

## 2019-10-19 RX ADMIN — Medication 100 MILLIGRAM(S): at 17:38

## 2019-10-19 RX ADMIN — Medication 325 MILLIGRAM(S): at 14:40

## 2019-10-19 RX ADMIN — HEPARIN SODIUM 5000 UNIT(S): 5000 INJECTION INTRAVENOUS; SUBCUTANEOUS at 21:12

## 2019-10-19 RX ADMIN — Medication 258 MILLIGRAM(S): at 05:55

## 2019-10-19 RX ADMIN — WARFARIN SODIUM 3 MILLIGRAM(S): 2.5 TABLET ORAL at 21:12

## 2019-10-19 RX ADMIN — SEVELAMER CARBONATE 1600 MILLIGRAM(S): 2400 POWDER, FOR SUSPENSION ORAL at 14:41

## 2019-10-19 RX ADMIN — CHLORHEXIDINE GLUCONATE 1 APPLICATION(S): 213 SOLUTION TOPICAL at 14:45

## 2019-10-19 RX ADMIN — PANTOPRAZOLE SODIUM 40 MILLIGRAM(S): 20 TABLET, DELAYED RELEASE ORAL at 14:41

## 2019-10-19 RX ADMIN — ATOVAQUONE 1500 MILLIGRAM(S): 750 SUSPENSION ORAL at 14:38

## 2019-10-19 RX ADMIN — Medication 480 MICROGRAM(S): at 14:41

## 2019-10-19 RX ADMIN — ERYTHROPOIETIN 10000 UNIT(S): 10000 INJECTION, SOLUTION INTRAVENOUS; SUBCUTANEOUS at 12:53

## 2019-10-19 RX ADMIN — SEVELAMER CARBONATE 1600 MILLIGRAM(S): 2400 POWDER, FOR SUSPENSION ORAL at 21:12

## 2019-10-19 NOTE — PROGRESS NOTE ADULT - ASSESSMENT
A/P  ESRD  ON  HD  ,  TOLERATING  IT  WELL  SO  FAR  TODAY  PC  WORKING  WELL  ON  EPOGEN WITH HD  UF  GOAL  3000CC  BP IS  STABLE  DURING HD    SWELLING  R  ARM, NEGATIVE  FOR  ARTERIAL  OR  VENOUS THROMBUS  IS  BEING  TREATED  FOR  CELLULITIS,  RESPONDING  WELL  TO  ABX  AS  PER  ID    OFF CELL  CEPT-   H/O SLE  PLT  COUNT  IMPROVING

## 2019-10-19 NOTE — PROGRESS NOTE ADULT - PROBLEM SELECTOR PLAN 6
on coumadin for PE .  INR 1.94  coumadin held for now. IMANI Bates on case.    restart coumadin once INR is therapeutic

## 2019-10-19 NOTE — PROGRESS NOTE ADULT - SUBJECTIVE AND OBJECTIVE BOX
Patient is a 44y Female with  ESRD  ON  HD    WAS  SEEN  DURING  HD  EARLIER  TODAY  USING  PC,  WORKING  WELL TODAY   R  ARM  SWELLING AND  PAIN IS  BETTER   NEGATIVE  FOR ARTERIAL  OR  VENOUS  THROMBUS ON  CT  ANGIO YESTERDAY    No Known Allergies    Hospital Medications:   MEDICATIONS  (STANDING):  atovaquone Suspension 1500 milliGRAM(s) Oral daily  ceFAZolin   IVPB      ceFAZolin   IVPB 1000 milliGRAM(s) IV Intermittent every 24 hours  chlorhexidine 2% Cloths 1 Application(s) Topical daily  epoetin renita Injectable 89084 Unit(s) IV Push <User Schedule>  ferrous    sulfate 325 milliGRAM(s) Oral daily  filgrastim-sndz Injectable 480 MICROGram(s) SubCutaneous daily  insulin lispro (HumaLOG) corrective regimen sliding scale   SubCutaneous Before meals and at bedtime  pantoprazole  Injectable 40 milliGRAM(s) IV Push daily  sevelamer carbonate 1600 milliGRAM(s) Oral three times a day  vancomycin  IVPB 500 milliGRAM(s) IV Intermittent <User Schedule>    REVIEW OF SYSTEMS:  NO  FEVER/CHILLS  HAD  COUGH  LAST NIGHT,  ,  NOT  RELATED  TO  HEPARIN  DRIP  NO  SOB OR  CH  PAINS   APPETITE  IS  GOOD  SWELLING  R  ARM IS  BETTER,  IS  SHOWING  TO ME  THAT   SHE  CAN  LIFT IT  UP  AND  BEND IT    [id number]  WELL    VITALS:  T(F): 97.9 (10-19-19 @ 14:42), Max: 98 (10-18-19 @ 20:30)  HR: 88 (10-19-19 @ 14:42)  BP: 120/69 (10-19-19 @ 14:42)  RR: 17 (10-19-19 @ 14:42)  SpO2: 97% (10-19-19 @ 14:42)  Wt(kg): --      PHYSICAL EXAM:  Constitutional: NAD  HEENT: CONJ PALE  Neck: No JVD  Respiratory: CTAB, no wheezes, rales or rhonchi  Cardiovascular: S1, S2, RRR  Gastrointestinal: BS+, soft, NT/ND  Extremities:   peripheral edema 1+  Neurological: A/O x 3,   :  No lazcano.   SWELLING  AND  REDNESS  R  ARM  IS BETTER, WARMTH  IS ALSO  DECREASED,   HER  BLISTER  OVER  FOREARM  RUPTURED  YESTERDAY  Vascular Access: R IJ  PC  WORKING  WELL,  BLOOD [id number]    LABS:  10-19    133<L>  |  98  |  45<H>  ----------------------------<  254<H>  4.2   |  21<L>  |  6.44<H>    Ca    7.4<L>      19 Oct 2019 08:23  Phos  4.6     10-19  Mg     2.2     10-19      Creatinine Trend: 6.44 <--, 5.68 <--, 7.64 <--, 6.81 <--                        8.3    7.66  )-----------( 26       ( 19 Oct 2019 08:23 )             27.4     Urine Studies:      RADIOLOGY & ADDITIONAL STUDIES:

## 2019-10-19 NOTE — PROGRESS NOTE ADULT - SUBJECTIVE AND OBJECTIVE BOX
feel better  pain at right arm is much less  softer, not tense  no fever, sob.    MEDICATIONS  (STANDING):  atovaquone Suspension 1500 milliGRAM(s) Oral daily  ceFAZolin   IVPB      ceFAZolin   IVPB 1000 milliGRAM(s) IV Intermittent every 24 hours  chlorhexidine 2% Cloths 1 Application(s) Topical daily  epoetin renita Injectable 30389 Unit(s) IV Push <User Schedule>  ferrous    sulfate 325 milliGRAM(s) Oral daily  filgrastim-sndz Injectable 480 MICROGram(s) SubCutaneous daily  heparin  Injectable 5000 Unit(s) SubCutaneous every 8 hours  insulin lispro (HumaLOG) corrective regimen sliding scale   SubCutaneous Before meals and at bedtime  pantoprazole  Injectable 40 milliGRAM(s) IV Push daily  sevelamer carbonate 1600 milliGRAM(s) Oral three times a day  vancomycin  IVPB 500 milliGRAM(s) IV Intermittent <User Schedule>  warfarin 3 milliGRAM(s) Oral once    MEDICATIONS  (PRN):  acetaminophen   Tablet .. 650 milliGRAM(s) Oral every 6 hours PRN Temp greater or equal to 38C (100.4F), Mild Pain (1 - 3)  traMADol 25 milliGRAM(s) Oral every 12 hours PRN Severe Pain (7 - 10)      Allergies    No Known Allergies    Intolerances        Vital Signs Last 24 Hrs  T(C): 36.6 (19 Oct 2019 14:42), Max: 36.7 (18 Oct 2019 20:30)  T(F): 97.9 (19 Oct 2019 14:42), Max: 98 (18 Oct 2019 20:30)  HR: 88 (19 Oct 2019 14:42) (84 - 98)  BP: 120/69 (19 Oct 2019 14:42) (102/62 - 120/69)  BP(mean): --  RR: 17 (19 Oct 2019 14:42) (16 - 17)  SpO2: 97% (19 Oct 2019 14:42) (97% - 100%)    PHYSICAL EXAM  General: adult in NAD  HEENT: clear oropharynx, anicteric sclera, pink conjunctiva  Neck: supple  CV: normal S1/S2 with no murmur rubs or gallops  Lungs: positive air movement b/l ant lungs,clear to auscultation, no wheezes, no rales  Abdomen: soft non-tender non-distended, no hepatosplenomegaly  Ext: no clubbing cyanosis or edema  Skin: no rashes and no petechiae  Neuro: alert and oriented X 4, no focal deficits  LABS:                          8.3    7.66  )-----------( 26       ( 19 Oct 2019 08:23 )             27.4         Mean Cell Volume : 89.5 fl  Mean Cell Hemoglobin : 27.1 pg  Mean Cell Hemoglobin Concentration : 30.3 gm/dL  Auto Neutrophil # : 6.66 K/uL  Auto Lymphocyte # : 0.15 K/uL  Auto Monocyte # : 0.84 K/uL  Auto Eosinophil # : 0.00 K/uL  Auto Basophil # : 0.00 K/uL  Auto Neutrophil % : 83.0 %  Auto Lymphocyte % : 2.0 %  Auto Monocyte % : 11.0 %  Auto Eosinophil % : 0.0 %  Auto Basophil % : 0.0 %    Serial CBC  Hematocrit 27.4  Hemoglobin 8.3  Plat 26  RBC 3.06  WBC 7.66  Serial CBC  Hematocrit 27.5  Hemoglobin 8.8  Plat 20  RBC 3.17  WBC 3.27  Serial CBC  Hematocrit 27.5  Hemoglobin 8.7  Plat 17  RBC 3.16  WBC 2.57  Serial CBC  Hematocrit 24.5  Hemoglobin 7.7  Plat 22  RBC 2.83  WBC 2.99  Serial CBC  Hematocrit 23.1  Hemoglobin 7.3  Plat 17  RBC 2.67  WBC 1.77  Serial CBC  Hematocrit 26.2  Hemoglobin 8.1  Plat 24  RBC 2.99  WBC 1.02    10-19    133<L>  |  98  |  45<H>  ----------------------------<  254<H>  4.2   |  21<L>  |  6.44<H>    Ca    7.4<L>      19 Oct 2019 08:23  Phos  4.6     10-19  Mg     2.2     10-19        PT/INR - ( 19 Oct 2019 08:23 )   PT: 22.0 sec;   INR: 1.94 ratio             Vitamin B12, Serum: >2000 pg/mL (10-17 @ 10:14)  Folate, Serum: 4.7 ng/mL (10-17 @ 10:14)            BLOOD SMEAR INTERPRETATION:       RADIOLOGY & ADDITIONAL STUDIES:

## 2019-10-19 NOTE — PROGRESS NOTE ADULT - SUBJECTIVE AND OBJECTIVE BOX
PGY 1 Note discussed with supervising resident and primary attending    Patient is a 44y old  Female who presents with a chief complaint of Right arm swelling, pain (18 Oct 2019 17:40)      INTERVAL HPI/OVERNIGHT EVENTS: Patient was seen and examined at bedside, no acute compalisn offred right arm swelling improving, went to HD     MEDICATIONS  (STANDING):  atovaquone Suspension 1500 milliGRAM(s) Oral daily  ceFAZolin   IVPB      ceFAZolin   IVPB 1000 milliGRAM(s) IV Intermittent every 24 hours  chlorhexidine 2% Cloths 1 Application(s) Topical daily  epoetin renita Injectable 46783 Unit(s) IV Push <User Schedule>  ferrous    sulfate 325 milliGRAM(s) Oral daily  filgrastim-sndz Injectable 480 MICROGram(s) SubCutaneous daily  insulin lispro (HumaLOG) corrective regimen sliding scale   SubCutaneous Before meals and at bedtime  pantoprazole  Injectable 40 milliGRAM(s) IV Push daily  sevelamer carbonate 1600 milliGRAM(s) Oral three times a day  vancomycin  IVPB 500 milliGRAM(s) IV Intermittent <User Schedule>    MEDICATIONS  (PRN):  acetaminophen   Tablet .. 650 milliGRAM(s) Oral every 6 hours PRN Temp greater or equal to 38C (100.4F), Mild Pain (1 - 3)  traMADol 25 milliGRAM(s) Oral every 12 hours PRN Severe Pain (7 - 10)      __________________________________________________  REVIEW OF SYSTEMS:    CONSTITUTIONAL: No fever,   EYES: no acute visual disturbances  NECK: No pain or stiffness  RESPIRATORY: No cough; No shortness of breath  CARDIOVASCULAR: No chest pain, no palpitations  GASTROINTESTINAL: No pain. No nausea or vomiting; No diarrhea   NEUROLOGICAL: No headache or numbness, no tremors  MUSCULOSKELETAL: No joint pain, no muscle pain  GENITOURINARY: no dysuria, no frequency, no hesitancy  PSYCHIATRY: no depression , no anxiety  ALL OTHER  ROS negative        Vital Signs Last 24 Hrs  T(C): 36.6 (19 Oct 2019 14:42), Max: 36.7 (18 Oct 2019 20:30)  T(F): 97.9 (19 Oct 2019 14:42), Max: 98 (18 Oct 2019 20:30)  HR: 88 (19 Oct 2019 14:42) (84 - 98)  BP: 120/69 (19 Oct 2019 14:42) (102/62 - 120/69)  BP(mean): --  RR: 17 (19 Oct 2019 14:42) (16 - 17)  SpO2: 97% (19 Oct 2019 14:42) (97% - 100%)    ________________________________________________  PHYSICAL EXAM:  GENERAL: NAD  HEENT: Normocephalic;  conjunctivae and sclerae clear; moist mucous membranes;   NECK : supple  CHEST/LUNG: Clear to auscultation bilaterally with good air entry   HEART: S1 S2  regular; no murmurs, gallops or rubs  ABDOMEN: Soft, Nontender, Nondistended; Bowel sounds present  EXTREMITIES: R arm swelling and tenderness  SKIN: warm and dry; no rash  NERVOUS SYSTEM:  Awake and alert; Oriented  to place, person and time ; no new deficits    _________________________________________________  LABS:                        8.3    7.66  )-----------( 26       ( 19 Oct 2019 08:23 )             27.4     10-19    133<L>  |  98  |  45<H>  ----------------------------<  254<H>  4.2   |  21<L>  |  6.44<H>    Ca    7.4<L>      19 Oct 2019 08:23  Phos  4.6     10-19  Mg     2.2     10-19      PT/INR - ( 19 Oct 2019 08:23 )   PT: 22.0 sec;   INR: 1.94 ratio             CAPILLARY BLOOD GLUCOSE      POCT Blood Glucose.: 153 mg/dL (19 Oct 2019 12:47)        RADIOLOGY & ADDITIONAL TESTS:    Imaging Personally Reviewed:  YES/NO    Consultant(s) Notes Reviewed:   YES/ No    Care Discussed with Consultants :     Plan of care was discussed with patient and /or primary care giver; all questions and concerns were addressed and care was aligned with patient's wishes.

## 2019-10-19 NOTE — PROGRESS NOTE ADULT - PROBLEM SELECTOR PLAN 3
INR today 1.94 .  Dr DIEGO. INR today 1.94 .  Dr DIEGO.  on heparin 5000 mg sub q, q8 hours and coumadin 3 mg.  follow pt/INR and adjust coumadin

## 2019-10-19 NOTE — PROGRESS NOTE ADULT - PROBLEM SELECTOR PLAN 3
INR down to 1.94  will begin coumadin  can give heparin 5000 q8h  needs to watch bleeding for low platelet

## 2019-10-19 NOTE — PROGRESS NOTE ADULT - PROBLEM SELECTOR PLAN 9
on coumadin for recent PE. Held because of supratherapeutic INR  INR today is 2.28 on coumadin for recent PE.   INR today is 1.94  Started on heparin sub q 5000 mg q 8 until INR becomes therapeutic, and coumadin 3 mg

## 2019-10-20 LAB
ANION GAP SERPL CALC-SCNC: 13 MMOL/L — SIGNIFICANT CHANGE UP (ref 5–17)
BASOPHILS # BLD AUTO: 0 K/UL — SIGNIFICANT CHANGE UP (ref 0–0.2)
BASOPHILS NFR BLD AUTO: 0 % — SIGNIFICANT CHANGE UP (ref 0–2)
BUN SERPL-MCNC: 28 MG/DL — HIGH (ref 7–18)
CALCIUM SERPL-MCNC: 7.1 MG/DL — LOW (ref 8.4–10.5)
CHLORIDE SERPL-SCNC: 97 MMOL/L — SIGNIFICANT CHANGE UP (ref 96–108)
CO2 SERPL-SCNC: 24 MMOL/L — SIGNIFICANT CHANGE UP (ref 22–31)
CREAT SERPL-MCNC: 4.15 MG/DL — HIGH (ref 0.5–1.3)
EOSINOPHIL # BLD AUTO: 0.01 K/UL — SIGNIFICANT CHANGE UP (ref 0–0.5)
EOSINOPHIL NFR BLD AUTO: 0.1 % — SIGNIFICANT CHANGE UP (ref 0–6)
GLUCOSE BLDC GLUCOMTR-MCNC: 209 MG/DL — HIGH (ref 70–99)
GLUCOSE BLDC GLUCOMTR-MCNC: 211 MG/DL — HIGH (ref 70–99)
GLUCOSE BLDC GLUCOMTR-MCNC: 216 MG/DL — HIGH (ref 70–99)
GLUCOSE SERPL-MCNC: 186 MG/DL — HIGH (ref 70–99)
HCT VFR BLD CALC: 26.6 % — LOW (ref 34.5–45)
HGB BLD-MCNC: 8.3 G/DL — LOW (ref 11.5–15.5)
IMM GRANULOCYTES NFR BLD AUTO: 7.7 % — HIGH (ref 0–1.5)
INR BLD: 2 RATIO — HIGH (ref 0.88–1.16)
LYMPHOCYTES # BLD AUTO: 0.23 K/UL — LOW (ref 1–3.3)
LYMPHOCYTES # BLD AUTO: 1.7 % — LOW (ref 13–44)
MAGNESIUM SERPL-MCNC: 2.1 MG/DL — SIGNIFICANT CHANGE UP (ref 1.6–2.6)
MCHC RBC-ENTMCNC: 27.5 PG — SIGNIFICANT CHANGE UP (ref 27–34)
MCHC RBC-ENTMCNC: 31.2 GM/DL — LOW (ref 32–36)
MCV RBC AUTO: 88.1 FL — SIGNIFICANT CHANGE UP (ref 80–100)
MONOCYTES # BLD AUTO: 0.48 K/UL — SIGNIFICANT CHANGE UP (ref 0–0.9)
MONOCYTES NFR BLD AUTO: 3.6 % — SIGNIFICANT CHANGE UP (ref 2–14)
NEUTROPHILS # BLD AUTO: 11.67 K/UL — HIGH (ref 1.8–7.4)
NEUTROPHILS NFR BLD AUTO: 86.9 % — HIGH (ref 43–77)
NRBC # BLD: 0 /100 WBCS — SIGNIFICANT CHANGE UP (ref 0–0)
PHOSPHATE SERPL-MCNC: 3.2 MG/DL — SIGNIFICANT CHANGE UP (ref 2.5–4.5)
PLATELET # BLD AUTO: 22 K/UL — LOW (ref 150–400)
POTASSIUM SERPL-MCNC: 3.3 MMOL/L — LOW (ref 3.5–5.3)
POTASSIUM SERPL-SCNC: 3.3 MMOL/L — LOW (ref 3.5–5.3)
PROTHROM AB SERPL-ACNC: 22.7 SEC — HIGH (ref 10–12.9)
RBC # BLD: 3.02 M/UL — LOW (ref 3.8–5.2)
RBC # FLD: 18.6 % — HIGH (ref 10.3–14.5)
SODIUM SERPL-SCNC: 134 MMOL/L — LOW (ref 135–145)
WBC # BLD: 13.43 K/UL — HIGH (ref 3.8–10.5)
WBC # FLD AUTO: 13.43 K/UL — HIGH (ref 3.8–10.5)

## 2019-10-20 RX ORDER — WARFARIN SODIUM 2.5 MG/1
3 TABLET ORAL ONCE
Refills: 0 | Status: COMPLETED | OUTPATIENT
Start: 2019-10-20 | End: 2019-10-20

## 2019-10-20 RX ORDER — POTASSIUM CHLORIDE 20 MEQ
40 PACKET (EA) ORAL ONCE
Refills: 0 | Status: COMPLETED | OUTPATIENT
Start: 2019-10-20 | End: 2019-10-20

## 2019-10-20 RX ADMIN — Medication 2: at 12:00

## 2019-10-20 RX ADMIN — Medication 60 MILLIGRAM(S): at 06:24

## 2019-10-20 RX ADMIN — Medication 100 MILLIGRAM(S): at 16:33

## 2019-10-20 RX ADMIN — Medication 40 MILLIEQUIVALENT(S): at 12:00

## 2019-10-20 RX ADMIN — CHLORHEXIDINE GLUCONATE 1 APPLICATION(S): 213 SOLUTION TOPICAL at 11:33

## 2019-10-20 RX ADMIN — Medication 2: at 17:43

## 2019-10-20 RX ADMIN — Medication 480 MICROGRAM(S): at 11:35

## 2019-10-20 RX ADMIN — PANTOPRAZOLE SODIUM 40 MILLIGRAM(S): 20 TABLET, DELAYED RELEASE ORAL at 11:32

## 2019-10-20 RX ADMIN — Medication 325 MILLIGRAM(S): at 11:32

## 2019-10-20 RX ADMIN — ATOVAQUONE 1500 MILLIGRAM(S): 750 SUSPENSION ORAL at 11:32

## 2019-10-20 RX ADMIN — SEVELAMER CARBONATE 1600 MILLIGRAM(S): 2400 POWDER, FOR SUSPENSION ORAL at 06:23

## 2019-10-20 RX ADMIN — SEVELAMER CARBONATE 1600 MILLIGRAM(S): 2400 POWDER, FOR SUSPENSION ORAL at 16:33

## 2019-10-20 RX ADMIN — WARFARIN SODIUM 3 MILLIGRAM(S): 2.5 TABLET ORAL at 21:40

## 2019-10-20 RX ADMIN — SEVELAMER CARBONATE 1600 MILLIGRAM(S): 2400 POWDER, FOR SUSPENSION ORAL at 21:40

## 2019-10-20 RX ADMIN — Medication 2: at 22:11

## 2019-10-20 NOTE — PROGRESS NOTE ADULT - SUBJECTIVE AND OBJECTIVE BOX
Patient is a 44y old  Female who presents with a chief complaint of Right arm swelling, pain (19 Oct 2019 16:46)/pancytopenia/cellulitis/drop H/H/S/P blood transfusion      INTERVAL HPI/OVERNIGHT EVENTS:  T(C): 36.2 (10-20-19 @ 06:45), Max: 36.7 (10-19-19 @ 14:15)  HR: 100 (10-20-19 @ 06:45) (84 - 100)  BP: 119/72 (10-20-19 @ 06:45) (112/64 - 131/73)  RR: 16 (10-20-19 @ 06:45) (16 - 17)  SpO2: 100% (10-20-19 @ 06:45) (97% - 100%)  Wt(kg): --    LABS:                        8.3    13.43 )-----------( 22       ( 20 Oct 2019 06:27 )             26.6     10-20    134<L>  |  97  |  28<H>  ----------------------------<  186<H>  3.3<L>   |  24  |  4.15<H>    Ca    7.1<L>      20 Oct 2019 06:27  Phos  3.2     10-20  Mg     2.1     10-20      PT/INR - ( 20 Oct 2019 06:27 )   PT: 22.7 sec;   INR: 2.00 ratio             CAPILLARY BLOOD GLUCOSE      POCT Blood Glucose.: 153 mg/dL (19 Oct 2019 12:47)    Culture - Blood (10.17.19 @ 13:59)    Specimen Source: .Blood    Culture Results:   No growth to date.        RADIOLOGY & ADDITIONAL TESTS:    < from: CT Upper Extremity w/ IV Cont, Right (10.18.19 @ 13:24) >  IMPRESSION:     No evidence of any major arterial or venous occlusion or a soft tissue   collection. Abnormal findings involving the subcutaneous soft tissues and   skin may be related to cellulitis. Correlate clinically.      < end of copied text >  Consultant(s) Notes Reviewed:  [x ] YES  [ ] NO    PHYSICAL EXAM:  GENERAL: well built, well nourished  HEAD:  Atraumatic, Normocephalic  EYES: EOMI, PERRLA, conjunctiva and sclera clear  ENT: No tonsillar erythema, exudates, or enlargement; Moist mucous membranes, Good dentition, No lesions  NECK: Supple, No JVD, Normal thyroid, no enlarged nodes  NERVOUS SYSTEM:  Alert & Oriented X3, Good concentration; Motor Strength 5/5 B/L upper and lower extremities; DTRs 2+ intact and symmetric, sensory intact  CHEST/LUNG: B/L good air entry; No rales, rhonchi, or wheezing, right chest P.C in place  HEART: S1S2 normal, no S3, Regular rate and rhythm; No murmurs, rubs, or gallops  ABDOMEN: Soft, Nontender, mild distended; Bowel sounds present  EXTREMITIES:  2+ Peripheral Pulses, No clubbing, cyanosis, right RUE edema warm on touch improved  LYMPH: No lymphadenopathy noted  SKIN: right arm open blister, ABD diffused discolored area LLQ area oozing blood    Care Discussed with Consultants/Other Providers [ x] YES  [ ] NO

## 2019-10-20 NOTE — PROGRESS NOTE ADULT - SUBJECTIVE AND OBJECTIVE BOX
Patient is seen and examined at the bed side, is afebrile. The RUE swelling improved a little.  The Leukopenia is resolving with Filgrastim         REVIEW OF SYSTEMS: All other review systems are negative      ALLERGIES:  No Known Allergies      Vital Signs Last 24 Hrs  T(C): 36.3 (20 Oct 2019 12:44), Max: 36.4 (19 Oct 2019 20:05)  T(F): 97.3 (20 Oct 2019 12:44), Max: 97.5 (19 Oct 2019 20:05)  HR: 66 (20 Oct 2019 17:50) (66 - 100)  BP: 149/59 (20 Oct 2019 17:50) (119/72 - 149/59)  BP(mean): --  RR: 14 (20 Oct 2019 17:50) (14 - 17)  SpO2: 100% (20 Oct 2019 17:50) (96% - 100%)      PHYSICAL EXAM:  GENERAL: Not in distress   CHEST/LUNG:  Air entry bilaterally, RIght  ACW Perm-cath in placed  HEART: s1 and s2 present  ABDOMEN:  Nontender and  Nondistended  EXTREMITIES: RUE swollen, mild erythematous and very tender  CNS: Awake and Alert        LABS:                        8.3    13.43 )-----------( 22       ( 20 Oct 2019 06:27 )             26.6                           8.8    3.27  )-----------( 20       ( 18 Oct 2019 07:15 )             27.5                           7.3    1.77  )-----------( 17       ( 17 Oct 2019 08:07 )             23.1       10-20    134<L>  |  97  |  28<H>  ----------------------------<  186<H>  3.3<L>   |  24  |  4.15<H>    Ca    7.1<L>      20 Oct 2019 06:27  Phos  3.2     10-20  Mg     2.1     10-20      10-18    139  |  103  |  37<H>  ----------------------------<  111<H>  3.6   |  26  |  5.68<H>    Ca    7.2<L>      18 Oct 2019 07:15  Phos  3.7     10-18  Mg     2.2     10-18    TPro  4.9<L>  /  Alb  1.2<L>  /  TBili  2.5<H>  /  DBili  x   /  AST  62<H>  /  ALT  114<H>  /  AlkPhos  122<H>  10-17          MEDICATIONS  (STANDING):  atovaquone Suspension 1500 milliGRAM(s) Oral daily  ceFAZolin   IVPB      ceFAZolin   IVPB 1000 milliGRAM(s) IV Intermittent every 24 hours  chlorhexidine 2% Cloths 1 Application(s) Topical daily  epoetin renita Injectable 43700 Unit(s) IV Push <User Schedule>  ferrous    sulfate 325 milliGRAM(s) Oral daily  filgrastim-sndz Injectable 480 MICROGram(s) SubCutaneous daily  insulin lispro (HumaLOG) corrective regimen sliding scale   SubCutaneous Before meals and at bedtime  pantoprazole  Injectable 40 milliGRAM(s) IV Push daily  predniSONE   Tablet 60 milliGRAM(s) Oral daily  sevelamer carbonate 1600 milliGRAM(s) Oral three times a day  vancomycin  IVPB 500 milliGRAM(s) IV Intermittent <User Schedule>  warfarin 3 milliGRAM(s) Oral once    MEDICATIONS  (PRN):  acetaminophen   Tablet .. 650 milliGRAM(s) Oral every 6 hours PRN Temp greater or equal to 38C (100.4F), Mild Pain (1 - 3)  traMADol 25 milliGRAM(s) Oral every 12 hours PRN Severe Pain (7 - 10)        RADIOLOGY & ADDITIONAL TESTS:    10/18/19 : CT Upper Extremity w/ IV Cont, Right (10.18.19 @ 13:24) No evidence of any major arterial or venous occlusion or a soft tissue   collection. Abnormal findings involving the subcutaneous soft tissues and skin may be related to cellulitis. Correlate clinically.    10/16/19 : Xray Chest 1 View- PORTABLE-Urgent (10.16.19 @ 11:24) Heart magnified by AP film shallow inspiration. Streaky   fibrotic/atelectatic change right mid and lower lung field. No gross focal infiltrate or pleural collection. Right hemodialysis catheter tip in the right atrium.          MICROBIOLOGY DATA:    Culture - Blood (10.17.19 @ 13:59)    Specimen Source: .Blood    Culture Results:   No growth to date.      Culture - Blood (10.17.19 @ 13:59)    Specimen Source: .Blood    Culture Results:   No growth to date.        Culture - Blood (10.16.19 @ 19:00)    Specimen Source: .Blood    Culture Results:   No growth to date.      Culture - Blood (10.16.19 @ 19:00)    Specimen Source: .Blood    Culture Results:   No growth to date. Patient is seen and examined at the bed side, is afebrile. The RUE swelling improved a little but redness is worsening.  The Leukopenia is resolving with Filgrastim.        REVIEW OF SYSTEMS: All other review systems are negative      ALLERGIES:  No Known Allergies      Vital Signs Last 24 Hrs  T(C): 36.3 (20 Oct 2019 12:44), Max: 36.4 (19 Oct 2019 20:05)  T(F): 97.3 (20 Oct 2019 12:44), Max: 97.5 (19 Oct 2019 20:05)  HR: 66 (20 Oct 2019 17:50) (66 - 100)  BP: 149/59 (20 Oct 2019 17:50) (119/72 - 149/59)  BP(mean): --  RR: 14 (20 Oct 2019 17:50) (14 - 17)  SpO2: 100% (20 Oct 2019 17:50) (96% - 100%)      PHYSICAL EXAM:  GENERAL: Not in distress   CHEST/LUNG:  Air entry bilaterally, RIght  ACW Perm-cath in placed  HEART: s1 and s2 present  ABDOMEN:  Nontender and  Nondistended  EXTREMITIES: RUE swollen, mild erythematous and very tender  CNS: Awake and Alert        LABS:                        8.3    13.43 )-----------( 22       ( 20 Oct 2019 06:27 )             26.6                           8.8    3.27  )-----------( 20       ( 18 Oct 2019 07:15 )             27.5                           7.3    1.77  )-----------( 17       ( 17 Oct 2019 08:07 )             23.1             10-20    134<L>  |  97  |  28<H>  ----------------------------<  186<H>  3.3<L>   |  24  |  4.15<H>    Ca    7.1<L>      20 Oct 2019 06:27  Phos  3.2     10-20  Mg     2.1     10-20      10-18    139  |  103  |  37<H>  ----------------------------<  111<H>  3.6   |  26  |  5.68<H>    Ca    7.2<L>      18 Oct 2019 07:15  Phos  3.7     10-18  Mg     2.2     10-18    TPro  4.9<L>  /  Alb  1.2<L>  /  TBili  2.5<H>  /  DBili  x   /  AST  62<H>  /  ALT  114<H>  /  AlkPhos  122<H>  10-17          MEDICATIONS  (STANDING):  atovaquone Suspension 1500 milliGRAM(s) Oral daily  ceFAZolin   IVPB      ceFAZolin   IVPB 1000 milliGRAM(s) IV Intermittent every 24 hours  chlorhexidine 2% Cloths 1 Application(s) Topical daily  epoetin renita Injectable 55743 Unit(s) IV Push <User Schedule>  ferrous    sulfate 325 milliGRAM(s) Oral daily  filgrastim-sndz Injectable 480 MICROGram(s) SubCutaneous daily  insulin lispro (HumaLOG) corrective regimen sliding scale   SubCutaneous Before meals and at bedtime  pantoprazole  Injectable 40 milliGRAM(s) IV Push daily  predniSONE   Tablet 60 milliGRAM(s) Oral daily  sevelamer carbonate 1600 milliGRAM(s) Oral three times a day  vancomycin  IVPB 500 milliGRAM(s) IV Intermittent <User Schedule>  warfarin 3 milliGRAM(s) Oral once    MEDICATIONS  (PRN):  acetaminophen   Tablet .. 650 milliGRAM(s) Oral every 6 hours PRN Temp greater or equal to 38C (100.4F), Mild Pain (1 - 3)  traMADol 25 milliGRAM(s) Oral every 12 hours PRN Severe Pain (7 - 10)        RADIOLOGY & ADDITIONAL TESTS:    10/18/19 : CT Upper Extremity w/ IV Cont, Right (10.18.19 @ 13:24) No evidence of any major arterial or venous occlusion or a soft tissue   collection. Abnormal findings involving the subcutaneous soft tissues and skin may be related to cellulitis. Correlate clinically.    10/16/19 : Xray Chest 1 View- PORTABLE-Urgent (10.16.19 @ 11:24) Heart magnified by AP film shallow inspiration. Streaky   fibrotic/atelectatic change right mid and lower lung field. No gross focal infiltrate or pleural collection. Right hemodialysis catheter tip in the right atrium.          MICROBIOLOGY DATA:    Culture - Blood (10.17.19 @ 13:59)    Specimen Source: .Blood    Culture Results:   No growth to date.      Culture - Blood (10.17.19 @ 13:59)    Specimen Source: .Blood    Culture Results:   No growth to date.        Culture - Blood (10.16.19 @ 19:00)    Specimen Source: .Blood    Culture Results:   No growth to date.      Culture - Blood (10.16.19 @ 19:00)    Specimen Source: .Blood    Culture Results:   No growth to date.

## 2019-10-20 NOTE — PROGRESS NOTE ADULT - SUBJECTIVE AND OBJECTIVE BOX
Patient is a 44y Female with  ESRD  ON  HD   WAS  DIALYZED  YESTERDAY VIA R  IJ  PC,  WORKED  WELL   SWELLING  AND  DISCOMFORT  R  ARM  BETTER    RANGE OF  MOVEMENT IS  IMPROVED    No Known Allergies    Hospital Medications:   MEDICATIONS  (STANDING):  atovaquone Suspension 1500 milliGRAM(s) Oral daily  ceFAZolin   IVPB      ceFAZolin   IVPB 1000 milliGRAM(s) IV Intermittent every 24 hours  chlorhexidine 2% Cloths 1 Application(s) Topical daily  epoetin renita Injectable 87676 Unit(s) IV Push <User Schedule>  ferrous    sulfate 325 milliGRAM(s) Oral daily  filgrastim-sndz Injectable 480 MICROGram(s) SubCutaneous daily  insulin lispro (HumaLOG) corrective regimen sliding scale   SubCutaneous Before meals and at bedtime  pantoprazole  Injectable 40 milliGRAM(s) IV Push daily  predniSONE   Tablet 60 milliGRAM(s) Oral daily  sevelamer carbonate 1600 milliGRAM(s) Oral three times a day  vancomycin  IVPB 500 milliGRAM(s) IV Intermittent <User Schedule>  warfarin 3 milliGRAM(s) Oral once    REVIEW OF SYSTEMS:  NO  FEVER/CHILLS  HAS NO  SOB   HAS SOME  COUGH   NO  CH  PAIN, PALPTATIONS  APPETITE IS NOT  GOOD  NO  N/V   HAS LEG  SWELLING  HAS NO  URINARY  COMPLAINTS  PAIN  AND  SWELLING R  ARM  BETTER    VITALS:  T(F): 97.3 (10-20-19 @ 12:44), Max: 98.1 (10-19-19 @ 14:15)  HR: 96 (10-20-19 @ 12:44)  BP: 120/80 (10-20-19 @ 12:44)  RR: 17 (10-20-19 @ 12:44)  SpO2: 96% (10-20-19 @ 12:44)  Wt(kg): --      PHYSICAL EXAM:  Constitutional: NAD  HEENT: CONJ  PALE  Neck: No JVD,  R IJ  PC IN  PLACE  Respiratory: CTAB, no wheezes, rales or rhonchi  Cardiovascular: S1, S2, RRR  Gastrointestinal: BS+, soft, NT/ND  Extremities:  peripheral edema 2 +  Neurological: A/O x 3,   :  No lazcano.   Skin: HAS PETECHIAL  RASH OVER  BOTH UPPER EXT  Vascular Access:R I J PC    LABS:  10-20    134<L>  |  97  |  28<H>  ----------------------------<  186<H>  3.3<L>   |  24  |  4.15<H>    Ca    7.1<L>      20 Oct 2019 06:27  Phos  3.2     10-20  Mg     2.1     10-20      Creatinine Trend: 4.15 <--, 6.44 <--, 5.68 <--, 7.64 <--, 6.81 <--                        8.3    13.43 )-----------( 22       ( 20 Oct 2019 06:27 )             26.6     Urine Studies:      RADIOLOGY & ADDITIONAL STUDIES:

## 2019-10-20 NOTE — PROGRESS NOTE ADULT - ASSESSMENT
Patient is a 44y old  Female who presents with multiple co-morbidities including ESRD via Right ACW Perm-cath and now presents to the ER for evaluation  of Right arm swelling, pain (16 Oct 2019 16:23). On admission, she has no fever but Leukopenia. She has started on Vancomycin and the ID consult requested to assist with further evaluation and antibiotic management.     # RUE Cellulitis -in the setting of Perm-cath at right ACW    would recommend:    1.  Keep RUE  elevated  2. Continue Cefazolin 1 g daily and Continue Vancomycin   3. Monitor and keep level between 15 to 20   4. Need to removal of Perm-cath   5. Pain management as needed      d/w patient and Nursing  staff    will follow the patient with you Patient is a 44y old  Female who presents with multiple co-morbidities including ESRD via Right ACW Perm-cath and now presents to the ER for evaluation  of Right arm swelling, pain (16 Oct 2019 16:23). On admission, she has no fever but Leukopenia. She has started on Vancomycin and the ID consult requested to assist with further evaluation and antibiotic management.     # RUE Cellulitis -in the setting of Perm-cath at right ACW    would recommend:    1. Consider discontinuing Filgrastim since WBC count elevated  2.  Keep RUE  elevated  3. Change Cefazolin to Cefepime 1 g daily and Change Vancomycin to Linezolid since redness is worsening   4. Need to removal of Perm-cath   5. Pain management as needed      d/w patient and Nursing  staff    will follow the patient with you

## 2019-10-20 NOTE — PROGRESS NOTE ADULT - ASSESSMENT
44 yr old female , from home, H/O lupus/pancytopenia/ESRD on HD/DM, recent sign AMA from other hospital for fever right arm swelling/pain/, CTA that time no evidence of emboli or abscess, send to ER by  renal for severe right arm swelling/warm/pain/pancytopenia, WBC in ER 1, plt 24 yesterday, mild drop H/H 7.3 today, per renal able to use P.C for HD today, S/P   one unit PRBC transfusion  during HD, VIT K 2.5 mg po one dose, start steroid pulse treatment for 3 days/neupogen, close monitor lab, ICU consult, if condition worsening, blood culture negative , continue IV ABS, ID consult/Hemo consult, vascular consult, rigth P.C working fine,  WBC improved 13 on zaexio , H/H stable after blood transfusion, still thrombocytopenia plt count 22,  heparin 5000 unit sq q 8hr hold LLQ area oozing blood, CT RUE no evidence of arteria/venous thrombus/no evidence of abscess, showed cellulitis, continue elevation RUE,  Iv ABS, close [id number] bleeding, if bleeding stop, resume heparin sq.

## 2019-10-20 NOTE — PROGRESS NOTE ADULT - ASSESSMENT
A/P  ESRD  ION  HD    WILL  DIALYZE  HER TOMORROW AS   HER REGULAR  SCHEDULE IS   MWF  ON  EPO  WITH HD   HEPARIN  FREE  PROTOCOL  WILL DO  WITH 3 K  BATH  IF  K  LESS  THAN  4    CELLULITIS R ARM ,   ON  ABX  AS  PER  ID     PANCYTOPENIA  SEC  TO  CELL  CEPT,  OFF IT  NOW    HIGH  WBC  S/P  ONE DOSE NEUPOGEN  FOLLOW PLT  COUNT

## 2019-10-21 VITALS
RESPIRATION RATE: 16 BRPM | TEMPERATURE: 97 F | DIASTOLIC BLOOD PRESSURE: 76 MMHG | HEART RATE: 86 BPM | SYSTOLIC BLOOD PRESSURE: 139 MMHG | OXYGEN SATURATION: 100 %

## 2019-10-21 LAB
ANION GAP SERPL CALC-SCNC: 14 MMOL/L — SIGNIFICANT CHANGE UP (ref 5–17)
ANISOCYTOSIS BLD QL: SLIGHT — SIGNIFICANT CHANGE UP
BASOPHILS # BLD AUTO: 0 K/UL — SIGNIFICANT CHANGE UP (ref 0–0.2)
BASOPHILS NFR BLD AUTO: 0 % — SIGNIFICANT CHANGE UP (ref 0–2)
BUN SERPL-MCNC: 36 MG/DL — HIGH (ref 7–18)
CALCIUM SERPL-MCNC: 7.7 MG/DL — LOW (ref 8.4–10.5)
CHLORIDE SERPL-SCNC: 95 MMOL/L — LOW (ref 96–108)
CO2 SERPL-SCNC: 23 MMOL/L — SIGNIFICANT CHANGE UP (ref 22–31)
CREAT SERPL-MCNC: 4.88 MG/DL — HIGH (ref 0.5–1.3)
CULTURE RESULTS: SIGNIFICANT CHANGE UP
CULTURE RESULTS: SIGNIFICANT CHANGE UP
DOHLE BOD BLD QL SMEAR: PRESENT — SIGNIFICANT CHANGE UP
EOSINOPHIL # BLD AUTO: 0 K/UL — SIGNIFICANT CHANGE UP (ref 0–0.5)
EOSINOPHIL NFR BLD AUTO: 0 % — SIGNIFICANT CHANGE UP (ref 0–6)
GLUCOSE BLDC GLUCOMTR-MCNC: 303 MG/DL — HIGH (ref 70–99)
GLUCOSE SERPL-MCNC: 192 MG/DL — HIGH (ref 70–99)
HCT VFR BLD CALC: 28.7 % — LOW (ref 34.5–45)
HGB BLD-MCNC: 8.9 G/DL — LOW (ref 11.5–15.5)
HYPOCHROMIA BLD QL: SLIGHT — SIGNIFICANT CHANGE UP
INR BLD: 2.94 RATIO — HIGH (ref 0.88–1.16)
LYMPHOCYTES # BLD AUTO: 0.15 K/UL — LOW (ref 1–3.3)
LYMPHOCYTES # BLD AUTO: 1 % — LOW (ref 13–44)
MAGNESIUM SERPL-MCNC: 2.2 MG/DL — SIGNIFICANT CHANGE UP (ref 1.6–2.6)
MANUAL SMEAR VERIFICATION: SIGNIFICANT CHANGE UP
MCHC RBC-ENTMCNC: 27.2 PG — SIGNIFICANT CHANGE UP (ref 27–34)
MCHC RBC-ENTMCNC: 31 GM/DL — LOW (ref 32–36)
MCV RBC AUTO: 87.8 FL — SIGNIFICANT CHANGE UP (ref 80–100)
MONOCYTES # BLD AUTO: 0.15 K/UL — SIGNIFICANT CHANGE UP (ref 0–0.9)
MONOCYTES NFR BLD AUTO: 1 % — LOW (ref 2–14)
NEUTROPHILS # BLD AUTO: 15.05 K/UL — HIGH (ref 1.8–7.4)
NEUTROPHILS NFR BLD AUTO: 94 % — HIGH (ref 43–77)
NEUTS BAND # BLD: 4 % — SIGNIFICANT CHANGE UP (ref 0–8)
NRBC # BLD: 0 /100 — SIGNIFICANT CHANGE UP (ref 0–0)
PHOSPHATE SERPL-MCNC: 3 MG/DL — SIGNIFICANT CHANGE UP (ref 2.5–4.5)
PLAT MORPH BLD: NORMAL — SIGNIFICANT CHANGE UP
PLATELET # BLD AUTO: 32 K/UL — LOW (ref 150–400)
PLATELET COUNT - ESTIMATE: ABNORMAL
POIKILOCYTOSIS BLD QL AUTO: SLIGHT — SIGNIFICANT CHANGE UP
POLYCHROMASIA BLD QL SMEAR: SLIGHT — SIGNIFICANT CHANGE UP
POTASSIUM SERPL-MCNC: 3.7 MMOL/L — SIGNIFICANT CHANGE UP (ref 3.5–5.3)
POTASSIUM SERPL-SCNC: 3.7 MMOL/L — SIGNIFICANT CHANGE UP (ref 3.5–5.3)
PROTHROM AB SERPL-ACNC: 33.7 SEC — HIGH (ref 10–12.9)
RBC # BLD: 3.27 M/UL — LOW (ref 3.8–5.2)
RBC # FLD: 18 % — HIGH (ref 10.3–14.5)
RBC BLD AUTO: ABNORMAL
SODIUM SERPL-SCNC: 132 MMOL/L — LOW (ref 135–145)
SPECIMEN SOURCE: SIGNIFICANT CHANGE UP
SPECIMEN SOURCE: SIGNIFICANT CHANGE UP
TOXIC GRANULES BLD QL SMEAR: PRESENT — SIGNIFICANT CHANGE UP
WBC # BLD: 15.36 K/UL — HIGH (ref 3.8–10.5)
WBC # FLD AUTO: 15.36 K/UL — HIGH (ref 3.8–10.5)

## 2019-10-21 RX ORDER — ERGOCALCIFEROL 1.25 MG/1
1 CAPSULE ORAL
Qty: 0 | Refills: 0 | DISCHARGE

## 2019-10-21 RX ORDER — LINEZOLID 600 MG/300ML
600 INJECTION, SOLUTION INTRAVENOUS ONCE
Refills: 0 | Status: COMPLETED | OUTPATIENT
Start: 2019-10-21 | End: 2019-10-21

## 2019-10-21 RX ORDER — SEVELAMER CARBONATE 2400 MG/1
2 POWDER, FOR SUSPENSION ORAL
Qty: 0 | Refills: 0 | DISCHARGE
Start: 2019-10-21

## 2019-10-21 RX ORDER — SEVELAMER CARBONATE 2400 MG/1
2 POWDER, FOR SUSPENSION ORAL
Qty: 0 | Refills: 0 | DISCHARGE

## 2019-10-21 RX ORDER — FAMOTIDINE 10 MG/ML
1 INJECTION INTRAVENOUS
Qty: 0 | Refills: 0 | DISCHARGE

## 2019-10-21 RX ORDER — ATOVAQUONE 750 MG/5ML
10 SUSPENSION ORAL
Qty: 0 | Refills: 0 | DISCHARGE

## 2019-10-21 RX ORDER — CARVEDILOL PHOSPHATE 80 MG/1
1 CAPSULE, EXTENDED RELEASE ORAL
Qty: 0 | Refills: 0 | DISCHARGE

## 2019-10-21 RX ORDER — AMLODIPINE BESYLATE 2.5 MG/1
1 TABLET ORAL
Qty: 0 | Refills: 0 | DISCHARGE

## 2019-10-21 RX ORDER — LINEZOLID 600 MG/300ML
INJECTION, SOLUTION INTRAVENOUS
Refills: 0 | Status: DISCONTINUED | OUTPATIENT
Start: 2019-10-21 | End: 2019-10-21

## 2019-10-21 RX ORDER — LINEZOLID 600 MG/300ML
600 INJECTION, SOLUTION INTRAVENOUS EVERY 12 HOURS
Refills: 0 | Status: DISCONTINUED | OUTPATIENT
Start: 2019-10-21 | End: 2019-10-21

## 2019-10-21 RX ORDER — CEFAZOLIN SODIUM 1 G
2 VIAL (EA) INJECTION
Qty: 1 | Refills: 0
Start: 2019-10-21 | End: 2019-10-21

## 2019-10-21 RX ORDER — CEFEPIME 1 G/1
1000 INJECTION, POWDER, FOR SOLUTION INTRAMUSCULAR; INTRAVENOUS EVERY 12 HOURS
Refills: 0 | Status: DISCONTINUED | OUTPATIENT
Start: 2019-10-21 | End: 2019-10-21

## 2019-10-21 RX ADMIN — SEVELAMER CARBONATE 1600 MILLIGRAM(S): 2400 POWDER, FOR SUSPENSION ORAL at 06:07

## 2019-10-21 RX ADMIN — LINEZOLID 300 MILLIGRAM(S): 600 INJECTION, SOLUTION INTRAVENOUS at 06:05

## 2019-10-21 RX ADMIN — Medication 325 MILLIGRAM(S): at 12:00

## 2019-10-21 RX ADMIN — PANTOPRAZOLE SODIUM 40 MILLIGRAM(S): 20 TABLET, DELAYED RELEASE ORAL at 12:00

## 2019-10-21 RX ADMIN — Medication 4: at 11:59

## 2019-10-21 RX ADMIN — CEFEPIME 100 MILLIGRAM(S): 1 INJECTION, POWDER, FOR SOLUTION INTRAMUSCULAR; INTRAVENOUS at 06:07

## 2019-10-21 RX ADMIN — Medication 1: at 08:47

## 2019-10-21 RX ADMIN — ATOVAQUONE 1500 MILLIGRAM(S): 750 SUSPENSION ORAL at 12:00

## 2019-10-21 RX ADMIN — Medication 60 MILLIGRAM(S): at 06:06

## 2019-10-21 RX ADMIN — CHLORHEXIDINE GLUCONATE 1 APPLICATION(S): 213 SOLUTION TOPICAL at 12:00

## 2019-10-21 NOTE — DISCHARGE NOTE NURSING/CASE MANAGEMENT/SOCIAL WORK - PATIENT PORTAL LINK FT
You can access the FollowMyHealth Patient Portal offered by [facility] by registering at the following website: [email or web address]. By joining [facility]’[facility] FollowMyHealth portal, you will also be able to view your health information using other applications (apps) compatible with our system.

## 2019-10-21 NOTE — PROGRESS NOTE ADULT - ASSESSMENT
Patient is a 44y Female with SLE. Was admitted in [facility] in August with lupus flare, BRIAN ( baseline SCR 1.3). A renal biopsy on 08/05/19 showed lupus {class IV+V with mild tubular atrophy, mild arterio sclerosis with no crescents). Was started on HD via RT IJ permacath and has since been on HD at [facility]. HAs remained anuric and deemed ESRD. On Monday this week presented to HD with RUE swelling, fever and chills. Sent to [facility] a CTA was negative for PC thrombus and an US did not reveal any abcess. Signed out AMA on Tuesday as her daughter was ill.      # ESRD admitted with RUE swelling. No central venous or arterial thrombosis. HD via permacath today.  seen by vascular surgery  cont ABX for cellulitis   # leucopenia improved with neupogen and steorids  # Lupus flare getting pulse steroids.  holding cellcept.  #anemia s/p PRBC ransfusion. procrit on HD   # thrombocytopenia from lupus     D/C planning.

## 2019-10-21 NOTE — PROGRESS NOTE ADULT - SUBJECTIVE AND OBJECTIVE BOX
[facility] : Progress Note :: [phone], (office [phone]),   Dr Hollis / Dr Pineda / Dr Saunders / Dr Padilla / Dr Thomas MCMULLEN / Dr Silveira / Dr Lizama / Dr Migel matamoros  _____________________________________________________________________________________________    seen on HD stable on HD     Hospital Medications:   MEDICATIONS  (STANDING):  atovaquone Suspension 1500 milliGRAM(s) Oral daily  cefepime   IVPB 1000 milliGRAM(s) IV Intermittent every 12 hours  chlorhexidine 2% Cloths 1 Application(s) Topical daily  epoetin renita Injectable 71551 Unit(s) IV Push <User Schedule>  ferrous    sulfate 325 milliGRAM(s) Oral daily  insulin lispro (HumaLOG) corrective regimen sliding scale   SubCutaneous Before meals and at bedtime  linezolid  IVPB      linezolid  IVPB 600 milliGRAM(s) IV Intermittent every 12 hours  pantoprazole  Injectable 40 milliGRAM(s) IV Push daily  predniSONE   Tablet 60 milliGRAM(s) Oral daily  sevelamer carbonate 1600 milliGRAM(s) Oral three times a day        VITALS:  T(F): 97.2 (10-21-19 @ 12:50), Max: 97.4 (10-21-19 @ 04:50)  HR: 83 (10-21-19 @ 12:50)  BP: 149/74 (10-21-19 @ 12:50)  RR: 16 (10-21-19 @ 12:50)  SpO2: 99% (10-21-19 @ 12:50)  Wt(kg): --      PHYSICAL EXAM:  Constitutional: NAD  HEENT: anicteric sclera, oropharynx clear.  Neck: No JVD  Respiratory: decreased air entry on the left  Cardiovascular: S1, S2, RRR  Gastrointestinal: BS+, soft, NT/ND  Extremities: No peripheral edema  Neurological: A/O x 3, no focal deficits  Vascular Access: groin shiley    LABS:  10-21    132<L>  |  95<L>  |  36<H>  ----------------------------<  192<H>  3.7   |  23  |  4.88<H>    Ca    7.7<L>      21 Oct 2019 06:36  Phos  3.0     10-21  Mg     2.2     10-21      Creatinine Trend: 4.88 <--, 4.15 <--, 6.44 <--, 5.68 <--, 7.64 <--, 6.81 <--                        8.9    15.36 )-----------( 32       ( 21 Oct 2019 06:36 )             28.7     Urine Studies:      RADIOLOGY & ADDITIONAL STUDIES:

## 2019-10-21 NOTE — PROGRESS NOTE ADULT - PROBLEM SELECTOR PROBLEM 3
PE (pulmonary thromboembolism)
SLE (systemic lupus erythematosus)
SLE (systemic lupus erythematosus)
Supratherapeutic INR
SLE (systemic lupus erythematosus)
Supratherapeutic INR
Supratherapeutic INR

## 2019-10-21 NOTE — PROGRESS NOTE ADULT - REASON FOR ADMISSION
Right arm swelling, pain
Right arm swelling, pain/pancytopenia
Right arm swelling, pain
Right arm swelling, pain
Right arm swelling, pain/pancytopenia
Right arm swelling, pain

## 2019-10-21 NOTE — PROGRESS NOTE ADULT - SUBJECTIVE AND OBJECTIVE BOX
doing well  no fever, pain  swelling of right arm is less      MEDICATIONS  (STANDING):  atovaquone Suspension 1500 milliGRAM(s) Oral daily  cefepime   IVPB 1000 milliGRAM(s) IV Intermittent every 12 hours  chlorhexidine 2% Cloths 1 Application(s) Topical daily  epoetin renita Injectable 78071 Unit(s) IV Push <User Schedule>  ferrous    sulfate 325 milliGRAM(s) Oral daily  insulin lispro (HumaLOG) corrective regimen sliding scale   SubCutaneous Before meals and at bedtime  linezolid  IVPB      linezolid  IVPB 600 milliGRAM(s) IV Intermittent every 12 hours  pantoprazole  Injectable 40 milliGRAM(s) IV Push daily  predniSONE   Tablet 60 milliGRAM(s) Oral daily  sevelamer carbonate 1600 milliGRAM(s) Oral three times a day    MEDICATIONS  (PRN):  acetaminophen   Tablet .. 650 milliGRAM(s) Oral every 6 hours PRN Temp greater or equal to 38C (100.4F), Mild Pain (1 - 3)      Allergies    No Known Allergies    Intolerances        Vital Signs Last 24 Hrs  T(C): 36.3 (21 Oct 2019 04:50), Max: 36.3 (20 Oct 2019 12:44)  T(F): 97.4 (21 Oct 2019 04:50), Max: 97.4 (21 Oct 2019 04:50)  HR: 88 (21 Oct 2019 04:50) (66 - 96)  BP: 134/72 (21 Oct 2019 04:50) (120/80 - 149/59)  BP(mean): --  RR: 16 (21 Oct 2019 04:50) (14 - 17)  SpO2: 100% (21 Oct 2019 04:50) (96% - 100%)    PHYSICAL EXAM  General: adult in NAD  HEENT: clear oropharynx, anicteric sclera, pink conjunctiva  Neck: supple  CV: normal S1/S2 with no murmur rubs or gallops  Lungs: positive air movement b/l ant lungs,clear to auscultation, no wheezes, no rales  Abdomen: soft non-tender non-distended, no hepatosplenomegaly  Ext: no clubbing cyanosis or edema  Skin: no rashes and no petechiae  Neuro: alert and oriented X 4, no focal deficits  LABS:                          8.9    15.36 )-----------( 32       ( 21 Oct 2019 06:36 )             28.7         Mean Cell Volume : 87.8 fl  Mean Cell Hemoglobin : 27.2 pg  Mean Cell Hemoglobin Concentration : 31.0 gm/dL  Auto Neutrophil # : 15.05 K/uL  Auto Lymphocyte # : 0.15 K/uL  Auto Monocyte # : 0.15 K/uL  Auto Eosinophil # : 0.00 K/uL  Auto Basophil # : 0.00 K/uL  Auto Neutrophil % : 94.0 %  Auto Lymphocyte % : 1.0 %  Auto Monocyte % : 1.0 %  Auto Eosinophil % : 0.0 %  Auto Basophil % : 0.0 %    Serial CBC  Hematocrit 28.7  Hemoglobin 8.9  Plat 32  RBC 3.27  WBC 15.36  Serial CBC  Hematocrit 26.6  Hemoglobin 8.3  Plat 22  RBC 3.02  WBC 13.43  Serial CBC  Hematocrit 27.4  Hemoglobin 8.3  Plat 26  RBC 3.06  WBC 7.66  Serial CBC  Hematocrit 27.5  Hemoglobin 8.8  Plat 20  RBC 3.17  WBC 3.27  Serial CBC  Hematocrit 27.5  Hemoglobin 8.7  Plat 17  RBC 3.16  WBC 2.57  Serial CBC  Hematocrit 24.5  Hemoglobin 7.7  Plat 22  RBC 2.83  WBC 2.99    10-21    132<L>  |  95<L>  |  36<H>  ----------------------------<  192<H>  3.7   |  23  |  4.88<H>    Ca    7.7<L>      21 Oct 2019 06:36  Phos  3.0     10-21  Mg     2.2     10-21        PT/INR - ( 21 Oct 2019 06:36 )   PT: 33.7 sec;   INR: 2.94 ratio             Vitamin B12, Serum: >2000 pg/mL (10-17 @ 10:14)  Folate, Serum: 4.7 ng/mL (10-17 @ 10:14)            BLOOD SMEAR INTERPRETATION:       RADIOLOGY & ADDITIONAL STUDIES:

## 2019-10-21 NOTE — PROGRESS NOTE ADULT - NSHPATTENDINGPLANDISCUSS_GEN_ALL_CORE
resident doctor/nurse/consultant
resident doctor/nurse
resident doctor/nurse
resident doctor/nurse/consultant

## 2019-10-21 NOTE — PROGRESS NOTE ADULT - PROBLEM SELECTOR PROBLEM 4
ESRD (end stage renal disease) on dialysis
SLE (systemic lupus erythematosus)
PE (pulmonary thromboembolism)
ESRD (end stage renal disease) on dialysis
ESRD (end stage renal disease) on dialysis

## 2019-10-21 NOTE — PROGRESS NOTE ADULT - PROBLEM SELECTOR PLAN 1
-p/w RUE swelling and pain,   -BP: stable afebrile   WBC in normal range  -Sepsis 2/2 cellulitis of RUE   -c/w Vanco after dialysis , started cefazolin qdaily.    -ID consult: Dr Pereyra consulted   - RUE elevation   -Rue Doppler showed no signs of DVT.
much better now  no fever  swelling is down  on antibiotics
no DVT at right arm  CTA did not show clot in SVC or subclavian  she was on coumadin  on antibiotics  the tense swelling is a liitle better
much improved  continue antibiotics
responding to antibiotics  the right arm is less red and swelling is less tense
-p/w RUE swelling and pain,   -BP: stable afebrile   WBC trended up  -Sepsis 2/2 cellulitis of RUE   -c/w Vanco after dialysis   - follow blood culture   -ID consult: Dr Pereyra consulted   - RUE elevation   Awaiting RUE doppler results, Blood cultures  -on vanco
-p/w RUE swelling and pain,   -BP: stable afebrile   WBC trended up  -Sepsis 2/2 cellulitis of RUE   -c/w Vanco after dialysis , started cefepime qdaily.    -ID consult: Dr Pereyra consulted   - RUE elevation   -Rue Doppler showed no signs of DVT.

## 2019-10-21 NOTE — PROGRESS NOTE ADULT - PROBLEM SELECTOR PLAN 4
-ESRD, on HD since August through Right perma cath, on M/W/F schedule  -Right perma cath dysfunction on Monday but she was able to receive complete session of HD    - Last HD on Monday   - Dr Hollis consulted   - Patient will go for HD today .  -vascular consult noted
-ESRD, on HD since August through Right perma cath, on M/W/F schedule  -Right perma cath dysfunction on Monday but she was able to receive complete session of HD    - Last HD on Monday   - Dr Hollis consulted   - Patient will go for HD today .  -vascular consult noted
-ESRD, on HD since August through Right perma cath, on M/W/F schedule  -Right perma cath dysfunction on Monday but she was able to receive complete session of HD    - Last HD on Monday   - Dr Hollis consulted   - Patient went for HD today .  -vascular consult noted
on steroid
in July, ?related with SLE, on coumadin

## 2019-10-21 NOTE — DISCHARGE NOTE PROVIDER - NSDCCPCAREPLAN_GEN_ALL_CORE_FT
PRINCIPAL DISCHARGE DIAGNOSIS  Diagnosis: Sepsis due to cellulitis  Assessment and Plan of Treatment: Rt arm pain and swelling x 4 days.   Admitted with sepsis due to RUE cellulitis   Problem/Plan - 1:  ·  Problem: Sepsis.  Plan: -p/w RUE swelling and pain,   -BP: stable afebrile   WBC trended up  -Sepsis 2/2 cellulitis of RUE   -c/w Vanco after dialysis   - follow blood culture   -ID consult: Dr Pereyra consulted   - RUE elevation         SECONDARY DISCHARGE DIAGNOSES  Diagnosis: ESRD (end stage renal disease) on dialysis  Assessment and Plan of Treatment: Continue your dialysis as intructed by your Nephrologist. Dialysis will be reinstated upon your discharge.  Please mantain a diet adequate for you condition  Continue dialysis on : ___ ___ and ___    Diagnosis: PE (pulmonary thromboembolism)  Assessment and Plan of Treatment:     Diagnosis: HTN (hypertension)  Assessment and Plan of Treatment: Continue with blood pressure medication. Maintain a healthy diet that consist of low sugar, low fat, low sodium diet. Exercise frequently if possible.  Follow up with primary care physician in one week after discharge. PRINCIPAL DISCHARGE DIAGNOSIS  Diagnosis: Sepsis due to cellulitis  Assessment and Plan of Treatment: You came with right arm pain and swelling x 4 days. You were admitted with sepsis due to cellulitis. You were given antibiotics. Your blood cultures were followed and ID Dr Pereyra consulted. Your pain and swelling is now better. Please:  - Continue with antibiotic Ancef for two weeks with dialysis.  - Continue with right upper extremity elevation.         SECONDARY DISCHARGE DIAGNOSES  Diagnosis: ESRD (end stage renal disease) on dialysis  Assessment and Plan of Treatment: Continue your dialysis as intructed by your Nephrologist. Dialysis will be reinstated upon your discharge.  Please mantain a diet adequate for you condition  Continue dialysis on : Monday, Wednesday and Friday.    Diagnosis: PE (pulmonary thromboembolism)  Assessment and Plan of Treatment: You are on coumadin for PE. You were found to have bleeding with heparin. Please resume coumadin as per renal after discharge and follow PT/INR.    Diagnosis: SLE (systemic lupus erythematosus)  Assessment and Plan of Treatment: Please taper your steroids as instucted and follow with your primary doctor.    Diagnosis: HTN (hypertension)  Assessment and Plan of Treatment: Continue with blood pressure medication. Maintain a healthy diet that consist of low sugar, low fat, low sodium diet. Exercise frequently if possible.  Follow up with primary care physician in one week after discharge. PRINCIPAL DISCHARGE DIAGNOSIS  Diagnosis: Sepsis due to cellulitis  Assessment and Plan of Treatment: You came with right arm pain and swelling x 4 days. You were admitted with sepsis due to cellulitis. You were given antibiotics. Your blood cultures were followed and ID Dr Pereyra consulted. Your pain and swelling is now better. Please:  - Continue with antibiotic Ancef for two weeks with dialysis.  - Continue with right upper extremity elevation.         SECONDARY DISCHARGE DIAGNOSES  Diagnosis: ESRD (end stage renal disease) on dialysis  Assessment and Plan of Treatment: Continue your dialysis as intructed by your Nephrologist. Dialysis will be reinstated upon your discharge.  Please mantain a diet adequate for you condition  Continue dialysis on : Monday, Wednesday and Friday.    Diagnosis: PE (pulmonary thromboembolism)  Assessment and Plan of Treatment: You are on coumadin for PE. You were found to have bleeding with heparin. Please resume coumadin as per renal after discharge and follow PT/INR.    Diagnosis: SLE (systemic lupus erythematosus)  Assessment and Plan of Treatment: Please taper your steroids as instucted and follow with your primary doctor.  6 tab(s) oral - orally once a day x 7 days  5 tab(s) oral - orally once a day x 7 days  4 tab(s) oral - orally once a day x 7 days  3 tab(s) oral - orally once a day x 7 days  2 tab(s) oral - orally once a day after that and follow with PCP.    Diagnosis: HTN (hypertension)  Assessment and Plan of Treatment: Continue with blood pressure medication. Maintain a healthy diet that consist of low sugar, low fat, low sodium diet. Exercise frequently if possible.  Follow up with primary care physician in one week after discharge.

## 2019-10-21 NOTE — PROGRESS NOTE ADULT - PROBLEM SELECTOR PLAN 2
-WBC in normal range now  platelets 26 k.   Hb today 8.3
due to LUPUS and sepsis  the platelet is better now,  WBC is up to 2.9 with zarxio
wbc is up with zaexio  platelet stable at 22  probably due to SLE
on zarxio  WBC is up, will dc zarxio  platelet is better, at 32
WBC is better with zarxio  platelet is still low  no bleeding
-WBC trended up little 1.77 RBC 2.67   platelets 17 k.   Will transfuse platelets if signs of bleeding. Patient will get 1 unit of PRBC .  DR Bates following the patient
-WBC trended up 3.27 RBC 3.17  platelets 20 k.   Hb after 1 unit PRBC is 8.8

## 2019-10-21 NOTE — PROGRESS NOTE ADULT - ASSESSMENT
44 yr old female , from home, H/O lupus/pancytopenia/ESRD on HD/DM, recent sign AMA from other hospital for fever right arm swelling/pain/, CTA that time no evidence of emboli or abscess, send to ER by  renal for severe right arm swelling/warm/pain/pancytopenia, WBC in ER 1, plt 24 yesterday, mild drop H/H 7.3 today, per renal able to use P.C for HD today, S/P   one unit PRBC transfusion  during HD, VIT K 2.5 mg po one dose, start steroid pulse treatment for 3 days/neupogen, close monitor lab, ICU consult, if condition worsening, blood culture negative , continue IV ABS, ID consult/Hemo consult, vascular consult, rigth P.C working fine,  WBC improved 15 on zaexio , H/H stable after blood transfusion, still thrombocytopenia plt count 32,  INR 2.9,  CT RUE no evidence of arteria/venous thrombus/no evidence of abscess, showed cellulitis, continue elevation RUE,  no intervention at this point, will continue use P.C for HD, continue prednisone 60 mg po qd and tapering as scheduled, D/C neupogen, D/C home, continue IV Ancef during HD for 2 more weeks, F/U INR/CBC cloesly by renal, resume coumadin per renal, F/U vascular near future for new P.C , when plt count improved. Adv continue keep RUE elevation

## 2019-10-21 NOTE — PROGRESS NOTE ADULT - PROVIDER SPECIALTY LIST ADULT
Heme/Onc
Heme/Onc
Infectious Disease
Internal Medicine
Nephrology
Vascular Surgery
Nephrology
Internal Medicine
Heme/Onc
Heme/Onc
Internal Medicine

## 2019-10-21 NOTE — DISCHARGE NOTE PROVIDER - HOSPITAL COURSE
44F, from home with PMHx of DM, HTN,  ESRD on HD (Rt Perma cath), SLE (on Medrol), PE, who presented to the ED with Rt arm pain and swelling x 4 days.  Pt states on Saturday evening she was having Rt Lateral wrist pain.  She awoke the next morning with Rt Upper extremity pain, swelling and erythema.  Pt states on Monday she went to [facility] for further investigation where she was admitted for Rt UE cellulitis and started on Vancomycin.  Pt underwent a CT and US of the extremity at that time which was negative for vascular disease.  Pt reports fever of 102 on Monday relieved with tylenol, and denies fever, or chills since.  Pt states she underwent HD on Monday, but one of the ports was not functioning properly.  Pt left [facility] because her daughter was feeling ill.  Pt went to HD today at [facility], and was found to have SBP in 70's with worsening RUE edema, and was sent to the ED at request of her nephrologist, Dr. Hollis, for further investigation.  Pt denies numbness/ or tingling to RUE.  Pt denies CP, palpitations, HA, dizziness, N/V/D, urinary symptoms or syncope.         WBC in ER 1, plt 24 yesterday, mild drop H/H 7.3 today, per renal able to use P.C for HD today, S/P   one unit PRBC transfusion  during HD, VIT K 2.5 mg po one dose, start steroid pulse treatment for 3 days/neupogen, close monitor lab, ICU consult, if condition worsening, blood culture negative , continue IV ABS, ID consult/Hemo consult, vascular consult, rigth P.C working fine,  WBC improved 15 on zaexio , H/H stable after blood transfusion, still thrombocytopenia plt count 32,  INR 2.9,  CT RUE no evidence of arteria/venous thrombus/no evidence of abscess, showed cellulitis, continue elevation RUE,  no intervention at this point, will continue use P.C for HD, continue prednisone 60 mg po qd and tapering as scheduled, D/C neupogen, D/C home, continue IV Ancef during HD for 2 more weeks, F/U INR/CBC cloesly by renal, resume coumadin per renal, F/U vascular near future for new P.C , when plt count improved. Adv continue keep RUE elevation        Patient is stable for discharge per attending and is advised to follow up with PCP as outpatient    Please refer to patient's complete medical chart with documents for a full hospital course, for this is only a brief summary.

## 2019-10-21 NOTE — PROGRESS NOTE ADULT - SUBJECTIVE AND OBJECTIVE BOX
Patient is a 44y old  Female who presents with a chief complaint of Right arm swelling, pain (21 Oct 2019 09:45)/cellulitis/pancytopenia, WBC improved      INTERVAL HPI/OVERNIGHT EVENTS:  T(C): 36.3 (10-21-19 @ 04:50), Max: 36.3 (10-20-19 @ 12:44)  HR: 88 (10-21-19 @ 04:50) (66 - 96)  BP: 134/72 (10-21-19 @ 04:50) (120/80 - 149/59)  RR: 16 (10-21-19 @ 04:50) (14 - 17)  SpO2: 100% (10-21-19 @ 04:50) (96% - 100%)  Wt(kg): --    LABS:                        8.9    15.36 )-----------( 32       ( 21 Oct 2019 06:36 )             28.7     10-21    132<L>  |  95<L>  |  36<H>  ----------------------------<  192<H>  3.7   |  23  |  4.88<H>    Ca    7.7<L>      21 Oct 2019 06:36  Phos  3.0     10-21  Mg     2.2     10-21      PT/INR - ( 21 Oct 2019 06:36 )   PT: 33.7 sec;   INR: 2.94 ratio             CAPILLARY BLOOD GLUCOSE      POCT Blood Glucose.: 209 mg/dL (20 Oct 2019 21:56)  POCT Blood Glucose.: 211 mg/dL (20 Oct 2019 16:59)  POCT Blood Glucose.: 216 mg/dL (20 Oct 2019 11:29)    Culture - Blood (10.17.19 @ 13:59)    Specimen Source: .Blood    Culture Results:   No growth to date.        RADIOLOGY & ADDITIONAL TESTS:  < from: CT Upper Extremity w/ IV Cont, Right (10.18.19 @ 13:24) >  IMPRESSION:     No evidence of any major arterial or venous occlusion or a soft tissue   collection. Abnormal findings involving the subcutaneous soft tissues and   skin may be related to cellulitis. Correlate clinically.        < end of copied text >    Consultant(s) Notes Reviewed:  [x ] YES  [ ] NO    PHYSICAL EXAM:  GENERAL: well built, well nourished  HEAD:  Atraumatic, Normocephalic  EYES: EOMI, PERRLA, conjunctiva and sclera clear  ENT: No tonsillar erythema, exudates, or enlargement; Moist mucous membranes, Good dentition, No lesions  NECK: Supple, No JVD, Normal thyroid, no enlarged nodes  NERVOUS SYSTEM:  Alert & Oriented X3, Good concentration; Motor Strength 5/5 B/L upper and lower extremities; DTRs 2+ intact and symmetric, sensory intact  CHEST/LUNG: B/L good air entry; No rales, rhonchi, or wheezing, right chest wall P.C in place  HEART: S1S2 normal, no S3, Regular rate and rhythm; No murmurs, rubs, or gallops  ABDOMEN: Soft, Nontender, Nondistended; Bowel sounds present  EXTREMITIES:  2+ Peripheral Pulses, No clubbing, cyanosis, RUE swelling improved, open blister  LYMPH: No lymphadenopathy noted  SKIN: RUE redness some improvement, ABD wall multiple discoloration, no active bleeding    Care Discussed with Consultants/Other Providers [ x] YES  [ ] NO

## 2019-10-21 NOTE — DISCHARGE NOTE PROVIDER - PROVIDER RX CONTACT NUMBER
The last insulin dose on file in her chart was 20 units sc bid (not 25 units), so I have changed the prescription accordingly.
[phone]

## 2019-10-22 LAB
CULTURE RESULTS: SIGNIFICANT CHANGE UP
CULTURE RESULTS: SIGNIFICANT CHANGE UP
SPECIMEN SOURCE: SIGNIFICANT CHANGE UP
SPECIMEN SOURCE: SIGNIFICANT CHANGE UP

## 2019-10-31 PROCEDURE — 36430 TRANSFUSION BLD/BLD COMPNT: CPT

## 2019-10-31 PROCEDURE — 83036 HEMOGLOBIN GLYCOSYLATED A1C: CPT

## 2019-10-31 PROCEDURE — 84100 ASSAY OF PHOSPHORUS: CPT

## 2019-10-31 PROCEDURE — 93971 EXTREMITY STUDY: CPT

## 2019-10-31 PROCEDURE — 82607 VITAMIN B-12: CPT

## 2019-10-31 PROCEDURE — 87340 HEPATITIS B SURFACE AG IA: CPT

## 2019-10-31 PROCEDURE — 86706 HEP B SURFACE ANTIBODY: CPT

## 2019-10-31 PROCEDURE — 82550 ASSAY OF CK (CPK): CPT

## 2019-10-31 PROCEDURE — 36415 COLL VENOUS BLD VENIPUNCTURE: CPT

## 2019-10-31 PROCEDURE — 82306 VITAMIN D 25 HYDROXY: CPT

## 2019-10-31 PROCEDURE — 83605 ASSAY OF LACTIC ACID: CPT

## 2019-10-31 PROCEDURE — 86850 RBC ANTIBODY SCREEN: CPT

## 2019-10-31 PROCEDURE — 71045 X-RAY EXAM CHEST 1 VIEW: CPT

## 2019-10-31 PROCEDURE — 86900 BLOOD TYPING SEROLOGIC ABO: CPT

## 2019-10-31 PROCEDURE — P9040: CPT

## 2019-10-31 PROCEDURE — 84702 CHORIONIC GONADOTROPIN TEST: CPT

## 2019-10-31 PROCEDURE — 99285 EMERGENCY DEPT VISIT HI MDM: CPT | Mod: 25

## 2019-10-31 PROCEDURE — 80048 BASIC METABOLIC PNL TOTAL CA: CPT

## 2019-10-31 PROCEDURE — 85027 COMPLETE CBC AUTOMATED: CPT

## 2019-10-31 PROCEDURE — 86140 C-REACTIVE PROTEIN: CPT

## 2019-10-31 PROCEDURE — 82962 GLUCOSE BLOOD TEST: CPT

## 2019-10-31 PROCEDURE — 71275 CT ANGIOGRAPHY CHEST: CPT

## 2019-10-31 PROCEDURE — 99261: CPT

## 2019-10-31 PROCEDURE — 86225 DNA ANTIBODY NATIVE: CPT

## 2019-10-31 PROCEDURE — 85652 RBC SED RATE AUTOMATED: CPT

## 2019-10-31 PROCEDURE — 86235 NUCLEAR ANTIGEN ANTIBODY: CPT

## 2019-10-31 PROCEDURE — 80061 LIPID PANEL: CPT

## 2019-10-31 PROCEDURE — 85610 PROTHROMBIN TIME: CPT

## 2019-10-31 PROCEDURE — 73201 CT UPPER EXTREMITY W/DYE: CPT

## 2019-10-31 PROCEDURE — 82746 ASSAY OF FOLIC ACID SERUM: CPT

## 2019-10-31 PROCEDURE — 87040 BLOOD CULTURE FOR BACTERIA: CPT

## 2019-10-31 PROCEDURE — 94640 AIRWAY INHALATION TREATMENT: CPT

## 2019-10-31 PROCEDURE — 80053 COMPREHEN METABOLIC PANEL: CPT

## 2019-10-31 PROCEDURE — 86923 COMPATIBILITY TEST ELECTRIC: CPT

## 2019-10-31 PROCEDURE — 84443 ASSAY THYROID STIM HORMONE: CPT

## 2019-10-31 PROCEDURE — 83735 ASSAY OF MAGNESIUM: CPT

## 2019-10-31 PROCEDURE — 86160 COMPLEMENT ANTIGEN: CPT

## 2019-10-31 PROCEDURE — 86901 BLOOD TYPING SEROLOGIC RH(D): CPT

## 2019-10-31 PROCEDURE — 85730 THROMBOPLASTIN TIME PARTIAL: CPT

## 2019-11-01 ENCOUNTER — OUTPATIENT (OUTPATIENT)
Dept: OUTPATIENT SERVICES | Facility: HOSPITAL | Age: 44
LOS: 1 days | End: 2019-11-01
Payer: MEDICAID

## 2019-11-01 PROCEDURE — G9001: CPT

## 2019-11-13 DIAGNOSIS — Z71.89 OTHER SPECIFIED COUNSELING: ICD-10-CM

## 2019-11-14 PROBLEM — I26.99 OTHER PULMONARY EMBOLISM WITHOUT ACUTE COR PULMONALE: Chronic | Status: ACTIVE | Noted: 2019-10-16

## 2019-11-14 PROBLEM — M32.9 SYSTEMIC LUPUS ERYTHEMATOSUS, UNSPECIFIED: Chronic | Status: ACTIVE | Noted: 2019-10-16

## 2019-11-14 PROBLEM — N18.6 END STAGE RENAL DISEASE: Chronic | Status: ACTIVE | Noted: 2019-10-16

## 2019-11-14 PROBLEM — I10 ESSENTIAL (PRIMARY) HYPERTENSION: Chronic | Status: ACTIVE | Noted: 2019-10-16

## 2019-11-14 PROBLEM — E11.9 TYPE 2 DIABETES MELLITUS WITHOUT COMPLICATIONS: Chronic | Status: ACTIVE | Noted: 2019-10-16

## 2023-02-22 NOTE — H&P ADULT - NSICDXFAMILYHX_GEN_ALL_CORE_FT
Previously Declined (within the last year) FAMILY HISTORY:  No pertinent family history in first degree relatives

## 2024-09-16 NOTE — ED ADULT NURSE NOTE - VOIDING
Quant HCG has fallen form 58.7 to 8.1.  Repeat HCG 9/20/24 or later and if negative no further testing necessary. anuric

## 2024-10-02 NOTE — PROGRESS NOTE ADULT - PROBLEM SELECTOR PLAN 5
Hemoglobin A1C, Whole Blood: 8.0:  On HSs
Her/She